# Patient Record
Sex: MALE | Race: WHITE | Employment: OTHER | URBAN - METROPOLITAN AREA
[De-identification: names, ages, dates, MRNs, and addresses within clinical notes are randomized per-mention and may not be internally consistent; named-entity substitution may affect disease eponyms.]

---

## 2018-03-07 ENCOUNTER — OFFICE VISIT (OUTPATIENT)
Dept: ORTHOPEDIC SURGERY | Age: 63
End: 2018-03-07

## 2018-03-07 VITALS
HEIGHT: 71 IN | BODY MASS INDEX: 29.41 KG/M2 | SYSTOLIC BLOOD PRESSURE: 149 MMHG | HEART RATE: 86 BPM | WEIGHT: 210.1 LBS | DIASTOLIC BLOOD PRESSURE: 97 MMHG

## 2018-03-07 DIAGNOSIS — M77.11 LATERAL EPICONDYLITIS OF RIGHT ELBOW: ICD-10-CM

## 2018-03-07 DIAGNOSIS — M25.521 RIGHT ELBOW PAIN: Primary | ICD-10-CM

## 2018-03-07 PROCEDURE — 99203 OFFICE O/P NEW LOW 30 MIN: CPT | Performed by: ORTHOPAEDIC SURGERY

## 2021-05-16 ENCOUNTER — HOSPITAL ENCOUNTER (EMERGENCY)
Age: 66
Discharge: HOME | End: 2021-05-16
Payer: COMMERCIAL

## 2021-05-16 ENCOUNTER — APPOINTMENT (OUTPATIENT)
Dept: CT IMAGING | Age: 66
End: 2021-05-16
Payer: COMMERCIAL

## 2021-05-16 VITALS
OXYGEN SATURATION: 96 % | DIASTOLIC BLOOD PRESSURE: 90 MMHG | HEIGHT: 71 IN | WEIGHT: 207 LBS | SYSTOLIC BLOOD PRESSURE: 138 MMHG | TEMPERATURE: 97.8 F | RESPIRATION RATE: 18 BRPM | HEART RATE: 66 BPM | BODY MASS INDEX: 28.98 KG/M2

## 2021-05-16 DIAGNOSIS — N13.2 HYDRONEPHROSIS WITH URETERAL CALCULUS: Primary | ICD-10-CM

## 2021-05-16 DIAGNOSIS — N20.0 KIDNEY STONE: ICD-10-CM

## 2021-05-16 LAB
A/G RATIO: 1.5 (ref 1.1–2.2)
ALBUMIN SERPL-MCNC: 4.3 G/DL (ref 3.4–5)
ALP BLD-CCNC: 79 U/L (ref 40–129)
ALT SERPL-CCNC: 23 U/L (ref 10–40)
ANION GAP SERPL CALCULATED.3IONS-SCNC: 7 MMOL/L (ref 3–16)
AST SERPL-CCNC: 23 U/L (ref 15–37)
BASOPHILS ABSOLUTE: 0 K/UL (ref 0–0.2)
BASOPHILS RELATIVE PERCENT: 0.8 %
BILIRUB SERPL-MCNC: 0.5 MG/DL (ref 0–1)
BILIRUBIN URINE: NEGATIVE
BLOOD, URINE: ABNORMAL
BUN BLDV-MCNC: 27 MG/DL (ref 7–20)
CALCIUM SERPL-MCNC: 9.9 MG/DL (ref 8.3–10.6)
CHLORIDE BLD-SCNC: 107 MMOL/L (ref 99–110)
CLARITY: CLEAR
CO2: 28 MMOL/L (ref 21–32)
COLOR: YELLOW
CREAT SERPL-MCNC: 1.1 MG/DL (ref 0.8–1.3)
EOSINOPHILS ABSOLUTE: 0.1 K/UL (ref 0–0.6)
EOSINOPHILS RELATIVE PERCENT: 1.6 %
EPITHELIAL CELLS, UA: ABNORMAL /HPF (ref 0–5)
GFR AFRICAN AMERICAN: >60
GFR NON-AFRICAN AMERICAN: >60
GLOBULIN: 2.9 G/DL
GLUCOSE BLD-MCNC: 129 MG/DL (ref 70–99)
GLUCOSE URINE: NEGATIVE MG/DL
HCT VFR BLD CALC: 45.3 % (ref 40.5–52.5)
HEMOGLOBIN: 15 G/DL (ref 13.5–17.5)
KETONES, URINE: NEGATIVE MG/DL
LEUKOCYTE ESTERASE, URINE: NEGATIVE
LYMPHOCYTES ABSOLUTE: 0.9 K/UL (ref 1–5.1)
LYMPHOCYTES RELATIVE PERCENT: 15 %
MCH RBC QN AUTO: 31.3 PG (ref 26–34)
MCHC RBC AUTO-ENTMCNC: 33.2 G/DL (ref 31–36)
MCV RBC AUTO: 94.3 FL (ref 80–100)
MICROSCOPIC EXAMINATION: YES
MONOCYTES ABSOLUTE: 0.5 K/UL (ref 0–1.3)
MONOCYTES RELATIVE PERCENT: 7.4 %
MUCUS: ABNORMAL /LPF
NEUTROPHILS ABSOLUTE: 4.5 K/UL (ref 1.7–7.7)
NEUTROPHILS RELATIVE PERCENT: 75.2 %
NITRITE, URINE: NEGATIVE
PDW BLD-RTO: 13.3 % (ref 12.4–15.4)
PH UA: 5.5 (ref 5–8)
PLATELET # BLD: 275 K/UL (ref 135–450)
PMV BLD AUTO: 8.4 FL (ref 5–10.5)
POTASSIUM SERPL-SCNC: 4.5 MMOL/L (ref 3.5–5.1)
PROTEIN UA: NEGATIVE MG/DL
RBC # BLD: 4.8 M/UL (ref 4.2–5.9)
RBC UA: ABNORMAL /HPF (ref 0–4)
SODIUM BLD-SCNC: 142 MMOL/L (ref 136–145)
SPECIFIC GRAVITY UA: >=1.03 (ref 1–1.03)
TOTAL PROTEIN: 7.2 G/DL (ref 6.4–8.2)
URINE TYPE: ABNORMAL
UROBILINOGEN, URINE: 0.2 E.U./DL
WBC # BLD: 6 K/UL (ref 4–11)
WBC UA: ABNORMAL /HPF (ref 0–5)

## 2021-05-16 PROCEDURE — 85025 COMPLETE CBC W/AUTO DIFF WBC: CPT

## 2021-05-16 PROCEDURE — 99283 EMERGENCY DEPT VISIT LOW MDM: CPT

## 2021-05-16 PROCEDURE — 6360000002 HC RX W HCPCS: Performed by: PHYSICIAN ASSISTANT

## 2021-05-16 PROCEDURE — 96374 THER/PROPH/DIAG INJ IV PUSH: CPT

## 2021-05-16 PROCEDURE — 81001 URINALYSIS AUTO W/SCOPE: CPT

## 2021-05-16 PROCEDURE — 96375 TX/PRO/DX INJ NEW DRUG ADDON: CPT

## 2021-05-16 PROCEDURE — 74176 CT ABD & PELVIS W/O CONTRAST: CPT

## 2021-05-16 PROCEDURE — 80053 COMPREHEN METABOLIC PANEL: CPT

## 2021-05-16 RX ORDER — ONDANSETRON 4 MG/1
4 TABLET, ORALLY DISINTEGRATING ORAL EVERY 8 HOURS PRN
Qty: 14 TABLET | Refills: 0 | Status: SHIPPED | OUTPATIENT
Start: 2021-05-16 | End: 2022-10-10

## 2021-05-16 RX ORDER — HYDROCODONE BITARTRATE AND ACETAMINOPHEN 5; 325 MG/1; MG/1
1 TABLET ORAL EVERY 8 HOURS PRN
Qty: 15 TABLET | Refills: 0 | Status: SHIPPED | OUTPATIENT
Start: 2021-05-16 | End: 2021-05-21

## 2021-05-16 RX ORDER — ONDANSETRON 2 MG/ML
4 INJECTION INTRAMUSCULAR; INTRAVENOUS ONCE
Status: COMPLETED | OUTPATIENT
Start: 2021-05-16 | End: 2021-05-16

## 2021-05-16 RX ORDER — KETOROLAC TROMETHAMINE 30 MG/ML
15 INJECTION, SOLUTION INTRAMUSCULAR; INTRAVENOUS ONCE
Status: COMPLETED | OUTPATIENT
Start: 2021-05-16 | End: 2021-05-16

## 2021-05-16 RX ORDER — ATORVASTATIN CALCIUM 40 MG/1
40 TABLET, FILM COATED ORAL DAILY
COMMUNITY

## 2021-05-16 RX ORDER — TAMSULOSIN HYDROCHLORIDE 0.4 MG/1
0.4 CAPSULE ORAL DAILY
Qty: 30 CAPSULE | Refills: 0 | Status: SHIPPED | OUTPATIENT
Start: 2021-05-16 | End: 2022-10-10

## 2021-05-16 RX ORDER — TADALAFIL 5 MG/1
TABLET ORAL
Status: ON HOLD | COMMUNITY
End: 2022-10-10

## 2021-05-16 RX ADMIN — ONDANSETRON 4 MG: 2 INJECTION INTRAMUSCULAR; INTRAVENOUS at 10:33

## 2021-05-16 RX ADMIN — KETOROLAC TROMETHAMINE 15 MG: 30 INJECTION, SOLUTION INTRAMUSCULAR; INTRAVENOUS at 10:32

## 2021-05-16 ASSESSMENT — PAIN SCALES - GENERAL
PAINLEVEL_OUTOF10: 8
PAINLEVEL_OUTOF10: 9

## 2021-05-16 ASSESSMENT — PAIN DESCRIPTION - PAIN TYPE: TYPE: ACUTE PAIN

## 2021-05-17 NOTE — ED PROVIDER NOTES
[facility] Emergency Department    CHIEF COMPLAINT  Flank Pain (left sided sudden onset 12)      SHARED SERVICE VISIT  Evaluated by CARSON. My supervising physician was available for consultation. HISTORY OF PRESENT ILLNESS  Kehinde Peace is a 60 y.o. male who presents to the ED complaining of left-sided flank pain that began this morning around 630. Patient states the pain began in the left flank and has now radiated and migrated to his left lower quadrant. Denies any urinary symptoms. Denies any fevers, chills, change in bowel movements. To a prior hernia surgery. Prior history of kidney stones in the past.  Patient states that he has developed nausea without vomiting. Denies chest pain or difficulty breathing. Jabs of pain is sharp. No other complaints, modifying factors or associated symptoms. Nursing notes reviewed. Past Medical History:   Diagnosis Date    Asthma     Hyperlipidemia      Past Surgical History:   Procedure Laterality Date    HERNIA REPAIR      SHOULDER ARTHROSCOPY       History reviewed. No pertinent family history.   Social History     Socioeconomic History    Marital status:      Spouse name: Not on file    Number of children: Not on file    Years of education: Not on file    Highest education level: Not on file   Occupational History    Not on file   Tobacco Use    Smoking status: Former Smoker     Quit date: 1983     Years since quittin.6    Smokeless tobacco: Never Used   Substance and Sexual Activity    Alcohol use: No    Drug use: No    Sexual activity: Not on file   Other Topics Concern    Not on file   Social History Narrative    Not on file     Social Determinants of Health     Financial Resource Strain:     Difficulty of Paying Living Expenses:    Food Insecurity:     Worried About Running Out of Food in the Last Year:     [address] in the Last Year:    Transportation Needs:     Lack of Transportation (Medical):  Lack of Transportation (Non-Medical):    Physical Activity:     Days of Exercise per Week:     Minutes of Exercise per Session:    Stress:     Feeling of Stress :    Social Connections:     Frequency of Communication with Friends and Family:     Frequency of Social Gatherings with Friends and Family:     Attends Worship Services:     Active Member of Clubs or Organizations:     Attends Club or Organization Meetings:     Marital Status:    Intimate Partner Violence:     Fear of Current or Ex-Partner:     Emotionally Abused:     Physically Abused:     Sexually Abused:      No current facility-administered medications for this encounter. Current Outpatient Medications   Medication Sig Dispense Refill    atorvastatin (LIPITOR) 40 MG tablet Take 40 mg by mouth daily      tadalafil (CIALIS) 5 MG tablet take 1 tablet by oral route  every day      ondansetron (ZOFRAN ODT) 4 MG disintegrating tablet Take 1 tablet by mouth every 8 hours as needed for Nausea or Vomiting 14 tablet 0    HYDROcodone-acetaminophen (NORCO) 5-325 MG per tablet Take 1 tablet by mouth every 8 hours as needed for Pain for up to 5 days. Intended supply: 3 days.  Take lowest dose possible to manage pain 15 tablet 0    tamsulosin (FLOMAX) 0.4 MG capsule Take 1 capsule by mouth daily 30 capsule 0    esomeprazole Magnesium (NEXIUM) 40 MG PACK Take 40 mg by mouth daily      fluticasone-salmeterol (ADVAIR) 100-50 MCG/DOSE diskus inhaler Inhale 1 puff into the lungs every 12 hours      diclofenac sodium (VOLTAREN) 1 % GEL Apply 2 g topically 4 times daily as needed for Pain 1 Tube 1     Allergies   Allergen Reactions    Bactrim [Sulfamethoxazole-Trimethoprim] Hives       REVIEW OF SYSTEMS  10 systems reviewed, pertinent positives per HPI otherwise noted to be negative    PHYSICAL EXAM  BP (!) 138/90   Pulse 66   Temp 97.8 °F (36.6 °C) (Oral)   Resp 18   Ht 5' 11\" (1.803 m)   Wt 207 lb (93.9 kg) SpO2 96%   BMI 28.87 kg/m²   GENERAL APPEARANCE: Awake and alert. Cooperative. HEAD: Normocephalic. Atraumatic. EYES: PERRL. EOM's grossly intact. ENT: Mucous membranes are moist.   NECK: Supple. HEART: RRR. No murmurs. LUNGS: Respirations unlabored. CTAB. Good air exchange. Speaking comfortably in full sentences. ABDOMEN: Soft. Nondistended. Tenderness to the right lower quadrant and right flank. No rebound or rigidity. No peritoneal signs. EXTREMITIES: No peripheral edema. Moves all extremities equally. All extremities neurovascularly intact. SKIN: Warm and dry. No acute rashes. NEUROLOGICAL: Alert and oriented. CN's 2-12 intact. No gross facial drooping. Strength 5/5, sensation intact. PSYCHIATRIC: Normal mood and affect. RADIOLOGY  CT ABDOMEN PELVIS WO CONTRAST Additional Contrast? None   Final Result   3.5 mm proximal left ureteral calculus causing severe left-sided   hydronephrosis. No other acute abnormality. Normal appendix. Moderately   enlarged prostate suspected be causing mild chronic bladder outlet   obstruction. Small hiatal hernia.                LABS  Labs Reviewed   CBC WITH AUTO DIFFERENTIAL - Abnormal; Notable for the following components:       Result Value    Lymphocytes Absolute 0.9 (*)     All other components within normal limits    Narrative:     Performed at:                  [address],  [place], [address]   Phone [phone]   COMPREHENSIVE METABOLIC PANEL - Abnormal; Notable for the following components:    Glucose 129 (*)     BUN 27 (*)     All other components within normal limits    Narrative:     Performed at:                  [facility]) [address],  [place], [address]   Phone [phone]   URINALYSIS - Abnormal; Notable for the following components:    Blood, Urine SMALL (*)     All other components within normal limits Narrative:     Performed at:                  [facility]) - [facility]                  [address] [facility], [facility][note 3][address]   Phone [phone]   MICROSCOPIC URINALYSIS - Abnormal; Notable for the following components:    Mucus, UA 3+ (*)     All other components within normal limits    Narrative:     Performed at:                  [facility]) - [facility]                  [address] [place] [address], [facility][note 3][address]   Phone [phone]       PROCEDURES  Unless otherwise noted below, none  Procedures    ED COURSE  Patient received Toradol for pain, with good relief. Triage vitals normal limits. A discussion was had with patient regarding CT findings regarding the seat rear hydronephrosis without BLADE with return precautions and plans to follow-up with urology. Risk management discussed and shared decision making had with patient and/or surrogate. All questions were answered. Patient will follow up with urology for further evaluation/treatment. All questions answered. Patient will return to ED for new/worsening symptoms. Patient was sent home with a prescription for Zofran, Norco and Flomax. CRITICAL CARE TIME  0 minutes of critical care time spent not including separately billable procedures. MDM  MDM  71-year-old male presents emergency department for evaluation of acute onset right flank pain and right lower quadrant pain. Nausea without vomiting. On arrival to ED patient's vitals within normal limits. On exam appears uncomfortable but not in acute distress. Physical exam has some tenderness to the right flank and right lower quadrant without any rebound rigidity. Lab work demonstrates no leukocytosis, BUN/creatinine 27-1 0.1 with GFR greater than 60. Urinalysis is remarkable for a small amount of blood, 3-4 RBCs without PVCs. Patient is afebrile. CT imaging demonstrated a 3.5 mm proximal stone with severe hydronephrosis.   I discussed this with the

## 2021-07-14 ENCOUNTER — HOSPITAL ENCOUNTER (EMERGENCY)
Age: 66
Discharge: HOME | End: 2021-07-14
Payer: COMMERCIAL

## 2021-07-14 ENCOUNTER — APPOINTMENT (OUTPATIENT)
Dept: GENERAL RADIOLOGY | Age: 66
End: 2021-07-14
Payer: COMMERCIAL

## 2021-07-14 VITALS
WEIGHT: 202 LBS | HEIGHT: 71 IN | TEMPERATURE: 97.9 F | SYSTOLIC BLOOD PRESSURE: 118 MMHG | HEART RATE: 74 BPM | DIASTOLIC BLOOD PRESSURE: 82 MMHG | BODY MASS INDEX: 28.28 KG/M2 | RESPIRATION RATE: 18 BRPM | OXYGEN SATURATION: 99 %

## 2021-07-14 DIAGNOSIS — R07.89 CHEST DISCOMFORT: Primary | ICD-10-CM

## 2021-07-14 LAB
A/G RATIO: 1.6 (ref 1.1–2.2)
ALBUMIN SERPL-MCNC: 4.1 G/DL (ref 3.4–5)
ALP BLD-CCNC: 80 U/L (ref 40–129)
ALT SERPL-CCNC: 17 U/L (ref 10–40)
ANION GAP SERPL CALCULATED.3IONS-SCNC: 10 MMOL/L (ref 3–16)
AST SERPL-CCNC: 15 U/L (ref 15–37)
BASOPHILS ABSOLUTE: 0.1 K/UL (ref 0–0.2)
BASOPHILS RELATIVE PERCENT: 1.3 %
BILIRUB SERPL-MCNC: 0.4 MG/DL (ref 0–1)
BUN BLDV-MCNC: 17 MG/DL (ref 7–20)
CALCIUM SERPL-MCNC: 9.4 MG/DL (ref 8.3–10.6)
CHLORIDE BLD-SCNC: 105 MMOL/L (ref 99–110)
CO2: 23 MMOL/L (ref 21–32)
CREAT SERPL-MCNC: 0.9 MG/DL (ref 0.8–1.3)
EKG ATRIAL RATE: 86 BPM
EKG DIAGNOSIS: NORMAL
EKG P AXIS: 36 DEGREES
EKG P-R INTERVAL: 138 MS
EKG Q-T INTERVAL: 336 MS
EKG QRS DURATION: 92 MS
EKG QTC CALCULATION (BAZETT): 402 MS
EKG R AXIS: 28 DEGREES
EKG T AXIS: 3 DEGREES
EKG VENTRICULAR RATE: 86 BPM
EOSINOPHILS ABSOLUTE: 0.1 K/UL (ref 0–0.6)
EOSINOPHILS RELATIVE PERCENT: 2.1 %
GFR AFRICAN AMERICAN: >60
GFR NON-AFRICAN AMERICAN: >60
GLOBULIN: 2.6 G/DL
GLUCOSE BLD-MCNC: 110 MG/DL (ref 70–99)
HCT VFR BLD CALC: 42.1 % (ref 40.5–52.5)
HEMOGLOBIN: 14.5 G/DL (ref 13.5–17.5)
LYMPHOCYTES ABSOLUTE: 1.2 K/UL (ref 1–5.1)
LYMPHOCYTES RELATIVE PERCENT: 24.5 %
MCH RBC QN AUTO: 31.4 PG (ref 26–34)
MCHC RBC AUTO-ENTMCNC: 34.5 G/DL (ref 31–36)
MCV RBC AUTO: 91.1 FL (ref 80–100)
MONOCYTES ABSOLUTE: 0.5 K/UL (ref 0–1.3)
MONOCYTES RELATIVE PERCENT: 9.5 %
NEUTROPHILS ABSOLUTE: 3.1 K/UL (ref 1.7–7.7)
NEUTROPHILS RELATIVE PERCENT: 62.6 %
PDW BLD-RTO: 13.5 % (ref 12.4–15.4)
PLATELET # BLD: 276 K/UL (ref 135–450)
PMV BLD AUTO: 7.3 FL (ref 5–10.5)
POTASSIUM REFLEX MAGNESIUM: 4.4 MMOL/L (ref 3.5–5.1)
RBC # BLD: 4.62 M/UL (ref 4.2–5.9)
SODIUM BLD-SCNC: 138 MMOL/L (ref 136–145)
TOTAL PROTEIN: 6.7 G/DL (ref 6.4–8.2)
TROPONIN: <0.01 NG/ML
TROPONIN: <0.01 NG/ML
WBC # BLD: 5 K/UL (ref 4–11)

## 2021-07-14 PROCEDURE — 85025 COMPLETE CBC W/AUTO DIFF WBC: CPT

## 2021-07-14 PROCEDURE — 80053 COMPREHEN METABOLIC PANEL: CPT

## 2021-07-14 PROCEDURE — 84484 ASSAY OF TROPONIN QUANT: CPT

## 2021-07-14 PROCEDURE — 71046 X-RAY EXAM CHEST 2 VIEWS: CPT

## 2021-07-14 PROCEDURE — 93010 ELECTROCARDIOGRAM REPORT: CPT | Performed by: INTERNAL MEDICINE

## 2021-07-14 PROCEDURE — 99284 EMERGENCY DEPT VISIT MOD MDM: CPT

## 2021-07-14 PROCEDURE — 93005 ELECTROCARDIOGRAM TRACING: CPT | Performed by: EMERGENCY MEDICINE

## 2021-07-14 NOTE — ED PROVIDER NOTES
EKG interpretation:  Normal sinus rhythm, rate of 86, normal axis, QTC within normal limits, no acute ST or T wave changes from prior dated 9/14/2016     Mackenzie Chong MD  07/14/21 5553

## 2021-07-14 NOTE — ED PROVIDER NOTES
tablet take 1 tablet by oral route  every day      ondansetron (ZOFRAN ODT) 4 MG disintegrating tablet Take 1 tablet by mouth every 8 hours as needed for Nausea or Vomiting 14 tablet 0    tamsulosin (FLOMAX) 0.4 MG capsule Take 1 capsule by mouth daily 30 capsule 0    diclofenac sodium (VOLTAREN) 1 % GEL Apply 2 g topically 4 times daily as needed for Pain 1 Tube 1    esomeprazole Magnesium (NEXIUM) 40 MG PACK Take 40 mg by mouth daily      fluticasone-salmeterol (ADVAIR) 100-50 MCG/DOSE diskus inhaler Inhale 1 puff into the lungs every 12 hours         ALLERGIES    Allergies   Allergen Reactions    Bactrim [Sulfamethoxazole-Trimethoprim] Hives       FAMILY HISTORY    History reviewed. No pertinent family history. SOCIAL HISTORY    Social History     Socioeconomic History    Marital status:      Spouse name: None    Number of children: None    Years of education: None    Highest education level: None   Occupational History    None   Tobacco Use    Smoking status: Former Smoker     Quit date: 1983     Years since quittin.8    Smokeless tobacco: Never Used   Substance and Sexual Activity    Alcohol use: No    Drug use: No    Sexual activity: None   Other Topics Concern    None   Social History Narrative    None     Social Determinants of Health     Financial Resource Strain:     Difficulty of Paying Living Expenses:    Food Insecurity:     Worried About Running Out of Food in the Last Year:     Ran Out of Food in the Last Year:    Transportation Needs:     Lack of Transportation (Medical):      Lack of Transportation (Non-Medical):    Physical Activity:     Days of Exercise per Week:     Minutes of Exercise per Session:    Stress:     Feeling of Stress :    Social Connections:     Frequency of Communication with Friends and Family:     Frequency of Social Gatherings with Friends and Family:     Attends [employer] Services:     Active Member of Clubs or Organizations:  Attends Club or Organization Meetings:     Marital Status:    Intimate Partner Violence:     Fear of Current or Ex-Partner:     Emotionally Abused:     Physically Abused:     Sexually Abused:        REVIEW OF SYSTEMS    10 systems reviewed, pertinent positives per HPI otherwise noted to be negative    PHYSICAL EXAM  Vitals:    07/14/21 1709   BP: 120/88   Pulse: 71   Resp: 16   Temp:    SpO2: 95%     GENERAL: Patient is well-developed, well-nourished. Awake and alert. Cooperative. Resting in bed. No apparent distress. HEENT:  Normocephalic, atraumatic. Conjunctiva appear normal. Sclera is non-icteric. External ears are normal.    NECK: Supple with normal ROM. Trachea midline. LUNGS: Equal and symmetric chest rise. Breathing is unlabored. Speaking comfortably in full sentences. Lungs are clear bilaterally to auscultation. Without wheezing, rales, or rhonchi. CADIOVASCULAR:  Regular rate and rhythm. Normal S1-S2 sounds. No murmurs, rubs, or gallops. Capillary refill is brisk in all 4 extremities. Bilateral lower extremities are equal in size, there is no swelling observed. There is no tenderness to palpation. There is no erythema observed or warmth palpated. GI: Soft, nontender, nondistended with positive bowel sounds. No rebound tenderness, guarding or any peritoneal signs. No masses or hepatosplenomegaly    MUSCULOSKELETAL:  No gross deformities or trauma noted. Moving all extremities equally and appropriately. Normal ROM. SKIN: Warm/dry. Skin is intact. No rashes/lesions noted. PSYCHIATRIC: Mood and affect appropriate. Speech is clear and articulate. NEUROLOGIC: Alert and oriented. No focal motor or sensory deficits. LABS  I have reviewed all labs for this visit.    Results for orders placed or performed during the hospital encounter of 07/14/21   CBC auto differential   Result Value Ref Range    WBC 5.0 4.0 - 11.0 K/uL    RBC 4.62 4.20 - 5.90 M/uL    Hemoglobin 14.5 13.5 - 17.5 g/dL Hematocrit 42.1 40.5 - 52.5 %    MCV 91.1 80.0 - 100.0 fL    MCH 31.4 26.0 - 34.0 pg    MCHC 34.5 31.0 - 36.0 g/dL    RDW 13.5 12.4 - 15.4 %    Platelets 911 658 - 428 K/uL    MPV 7.3 5.0 - 10.5 fL    Neutrophils % 62.6 %    Lymphocytes % 24.5 %    Monocytes % 9.5 %    Eosinophils % 2.1 %    Basophils % 1.3 %    Neutrophils Absolute 3.1 1.7 - 7.7 K/uL    Lymphocytes Absolute 1.2 1.0 - 5.1 K/uL    Monocytes Absolute 0.5 0.0 - 1.3 K/uL    Eosinophils Absolute 0.1 0.0 - 0.6 K/uL    Basophils Absolute 0.1 0.0 - 0.2 K/uL   Comprehensive Metabolic Panel w/ Reflex to MG   Result Value Ref Range    Sodium 138 136 - 145 mmol/L    Potassium reflex Magnesium 4.4 3.5 - 5.1 mmol/L    Chloride 105 99 - 110 mmol/L    CO2 23 21 - 32 mmol/L    Anion Gap 10 3 - 16    Glucose 110 (H) 70 - 99 mg/dL    BUN 17 7 - 20 mg/dL    CREATININE 0.9 0.8 - 1.3 mg/dL    GFR Non-African American >60 >60    GFR African American >60 >60    Calcium 9.4 8.3 - 10.6 mg/dL    Total Protein 6.7 6.4 - 8.2 g/dL    Albumin 4.1 3.4 - 5.0 g/dL    Albumin/Globulin Ratio 1.6 1.1 - 2.2    Total Bilirubin 0.4 0.0 - 1.0 mg/dL    Alkaline Phosphatase 80 40 - 129 U/L    ALT 17 10 - 40 U/L    AST 15 15 - 37 U/L    Globulin 2.6 g/dL   Troponin   Result Value Ref Range    Troponin <0.01 <0.01 ng/mL   Troponin   Result Value Ref Range    Troponin <0.01 <0.01 ng/mL   EKG 12 Lead   Result Value Ref Range    Ventricular Rate 86 BPM    Atrial Rate 86 BPM    P-R Interval 138 ms    QRS Duration 92 ms    Q-T Interval 336 ms    QTc Calculation (Bazett) 402 ms    P Axis 36 degrees    R Axis 28 degrees    T Axis 3 degrees    Diagnosis       Normal sinus rhythmPossible Inferior infarct (cited on or before 14-SEP-2016)Abnormal ECGWhen compared with ECG of 14-SEP-2016 14:04,Questionable change in initial forces of Inferior leads       RADIOLOGY    XR CHEST (2 VW)    Result Date: 7/14/2021  EXAMINATION: TWO XRAY VIEWS OF THE CHEST 7/14/2021 3:52 pm COMPARISON: Chest x-ray dated 2016 HISTORY: ORDERING SYSTEM PROVIDED HISTORY: chest pain TECHNOLOGIST PROVIDED HISTORY: Reason for exam:->chest pain Reason for Exam: chest pain, Acuity: Acute Type of Exam: Initial FINDINGS: HEART/MEDIASTINUM: The cardiomediastinal silhouette is within normal limits. PLEURA/LUNGS: There are no focal consolidations or pleural effusions. There is no appreciable pneumothorax. BONES/SOFT TISSUE: No acute abnormality. No radiographic evidence of acute pulmonary disease. HEART SCORE:    History: +1 for moderate suspicion  EKG: +0 for normal EKG   Age: +1 for age 44-72 years  Risk factors (includes HLD, HTN, DM, tobacco use, obesity, and +FHx): +1 for 1-2 risk factors  Initial troponin: +0 for negative troponin    Heart score: 3. This falls under the following category: Score of 0-3, which indicates a very low risk for major adverse cardiac event and supports early discharge    Score 0-3 0.9%-1.7% risk of major adverse cardiac event (MACE). In the HEART Score study, these patients were discharged. REVIEW OF RECORDS                       Test Date:    2016 11:27:10   Melba Jenkins TONY            Department:     Patient ID:   [id number]                 AVMO:           Esther Chow DEPI                     Technician:     [id number]               Requested By: Aundrea CHRISTOPHER Order Number: [id number]                [id number] MD:   France Polanco. Heather Dalton, [address] [place] [address]     AGE:     61     SKBPLE:  46  OU. WEIGHT:       213 lbs.                  SUPERVISING MD:    Shari Cao MD, [facility]     ORDERING MD:  SCOTT BEHRENS A.      SUPERVISING RN:    Andrea Suero     CLINICAL HISTORY:     CAD: No   Diabetic: No   HX of MI: No   Family HX: No   Prior CABG: No   Prior Cath: No   Angina: None                 REASON FOR EXAM:   abnormal ecg _____________________________________________________________________________     TEST OBSERVATIONS: SR at baseline. Exercise time 5:05, terminated due to SOB,   leg pain, fatigue. Pt denied chest pain/discomfort. Temi CARO         TEST TERMINATED DUE TO:   Dyspnea Maximum heart rate obtained   __________________________________________________________________________   PROCEDURE:     The patient was stressed using the RIMMA protocol under   the supervision of the cardiologist or nurse. Total exercise time is 05:05. Resting HR was 91 bmp. Maximum HR was 154 bpm;   which represented 96% of the maximum age predicted HR. Resting blood pressure was 130/90 mmHg and   maximum blood pressure was 170/100 mmHg.     The maximum work load was  6.4 mets. Resting ECG: Sinus     Stress ECG:     Negative for ischemia   Blood Pressure Response: Unremarkable     Heart Rate Response: Brisk     Exercise Tolerance: Fair   [facility] Treadmill Score: 5   __________________________________________________________________________     IMPRESSION:     The result of this study is negative for ischemia     The risk of this study is low       Electronically Signed On 9- 12:41:55 EDT by Rafia Millan. Kerri Cardoza MD     Date: 09/23/16   Received From: [facility]      ED COURSE/MDM  Patient seen and evaluated. Old records reviewed. Diagnostic testing reviewed and results discussed. I have evaluated this patient. My supervising physician was available for consultation. Hyacinth Berg presented to the ED today with above noted complaints. Physical exam unremarkable. No reproducible pain to palpation of the chest wall. Blood work is without evidence of systemic infection. No anemia. No electrolyte abnormality. No evidence of acute kidney injury or transaminitis. Troponin is negative. Chest x-ray is without acute findings. EKG shows sinus rhythm and is unchanged from prior. Repeat troponin negative.     Patient

## 2021-08-02 ENCOUNTER — OFFICE VISIT (OUTPATIENT)
Dept: CARDIOLOGY CLINIC | Age: 66
End: 2021-08-02
Payer: MEDICARE

## 2021-08-02 VITALS
WEIGHT: 211.5 LBS | HEART RATE: 70 BPM | SYSTOLIC BLOOD PRESSURE: 112 MMHG | BODY MASS INDEX: 29.61 KG/M2 | DIASTOLIC BLOOD PRESSURE: 75 MMHG | HEIGHT: 71 IN | OXYGEN SATURATION: 96 %

## 2021-08-02 DIAGNOSIS — Z87.891 HISTORY OF TOBACCO ABUSE: Primary | ICD-10-CM

## 2021-08-02 DIAGNOSIS — R07.9 CHEST PAIN, UNSPECIFIED TYPE: ICD-10-CM

## 2021-08-02 PROCEDURE — 1036F TOBACCO NON-USER: CPT | Performed by: INTERNAL MEDICINE

## 2021-08-02 PROCEDURE — G8419 CALC BMI OUT NRM PARAM NOF/U: HCPCS | Performed by: INTERNAL MEDICINE

## 2021-08-02 PROCEDURE — G8427 DOCREV CUR MEDS BY ELIG CLIN: HCPCS | Performed by: INTERNAL MEDICINE

## 2021-08-02 PROCEDURE — 99204 OFFICE O/P NEW MOD 45 MIN: CPT | Performed by: INTERNAL MEDICINE

## 2021-08-02 PROCEDURE — 3017F COLORECTAL CA SCREEN DOC REV: CPT | Performed by: INTERNAL MEDICINE

## 2021-08-02 RX ORDER — LISINOPRIL 10 MG/1
10 TABLET ORAL DAILY
Status: ON HOLD | COMMUNITY
Start: 2021-06-07 | End: 2022-10-10

## 2021-08-02 NOTE — PROGRESS NOTES
[facility]   Cardiac Consultation    Referring Provider:  Cielo Servin MD     Chief Complaint   Patient presents with    New Patient    Chest Pain     pressure on chest by sternum        History of Present Illness:  Eddie Jones 1955 who self-referred as new patient to establish care in case he needs cardiologist in future. He has PMH of borderline HTN, HLD, DM, and prior tobacco use. No documented CAD. He presented to ED 7/14/2021 with chest pain with nausea. Note EKG showed NSR, rate of 86, normal axis, QTC within normal limits (no acute changes from prior dated 9/14/2016). Troponin serial <0.1 x 2. Chest X-ray was negative. Note GXT myoview stress test 9/23/2016 was a low risk study negative for ischemia. Most recent GXT nuclear stress test 7/28/21 was normal with 56% EF. Today he reports left sided chest pressure prior to going to the ED. It was 6AM and he had gotten up to let dogs out. Reports left pressure pain that was constant for 3 days. It was associated with nausea. Nothing made better or worse. It resolved on its own. It has not happened again. He does get baseline ENGEL but he notes that it is due to his asthma. Note no further CP since ER visit. He has not taken lisinopril for the past 2 days. He notes that he has only had borderline hypertension. He is on BP medications at his request not at doctors recommendations. Patient with no complaints of chest pain, SOB, palpitations, dizziness, edema, or orthopnea/PND. Past Medical History:   has a past medical history of Asthma and Hyperlipidemia. Surgical History:   has a past surgical history that includes hernia repair and Shoulder arthroscopy. Social History:   reports that he quit smoking about 37 years ago. He has never used smokeless tobacco. He reports that he does not drink alcohol and does not use drugs. He quit smoking in 1983. He lives in [facility] [facility] with his wife. He has 9 children.       Family History:  Father  of CHF in [de-identified]  Mother passed 80. She had HTN. Home Medications:  Prior to Admission medications    Medication Sig Start Date End Date Taking? Authorizing Provider   lisinopril (PRINIVIL;ZESTRIL) 10 MG tablet Take 10 mg by mouth daily 21  Yes Historical Provider, MD   atorvastatin (LIPITOR) 40 MG tablet Take 40 mg by mouth daily   Yes Historical Provider, MD   tadalafil (CIALIS) 5 MG tablet take 1 tablet by oral route  every day   Yes Historical Provider, MD   esomeprazole Magnesium (NEXIUM) 40 MG PACK Take 40 mg by mouth daily   Yes Historical Provider, MD   fluticasone-salmeterol (ADVAIR) 100-50 MCG/DOSE diskus inhaler Inhale 1 puff into the lungs every 12 hours   Yes Historical Provider, MD   ondansetron (ZOFRAN ODT) 4 MG disintegrating tablet Take 1 tablet by mouth every 8 hours as needed for Nausea or Vomiting  Patient not taking: Reported on 2021   Reagan Reed PA-C   tamsulosin [facility]) 0.4 MG capsule Take 1 capsule by mouth daily  Patient not taking: Reported on 2021   Reagan Reed PA-C   diclofenac sodium (VOLTAREN) 1 % GEL Apply 2 g topically 4 times daily as needed for Pain 3/7/18 4/6/18  Omid Mahan MD        Allergies:  Bactrim [sulfamethoxazole-trimethoprim]     Review of Systems:   · Constitutional: there has been no unanticipated weight loss. There's been no change in energy level, sleep pattern, or activity level. · Eyes: No visual changes or diplopia. No scleral icterus. · ENT: No Headaches, hearing loss or vertigo. No mouth sores or sore throat. · Cardiovascular: Reviewed in HPI  · Respiratory: No cough or wheezing, no sputum production. No hematemesis. · Gastrointestinal: No abdominal pain, appetite loss, blood in stools. No change in bowel or bladder habits. · Genitourinary: No dysuria, trouble voiding, or hematuria. · Musculoskeletal:  No gait disturbance, weakness or joint complaints.   · Integumentary: No rash or pruritis. · Neurological: No headache, diplopia, change in muscle strength, numbness or tingling. No change in gait, balance, coordination, mood, affect, memory, mentation, behavior. · Psychiatric: No anxiety, no depression. · Endocrine: No malaise, fatigue or temperature intolerance. No excessive thirst, fluid intake, or urination. No tremor. · Hematologic/Lymphatic: No abnormal bruising or bleeding, blood clots or swollen lymph nodes. · Allergic/Immunologic: No nasal congestion or hives. Physical Examination:    Vitals:    08/02/21 0831   BP: 112/75   Pulse: 70   SpO2: 96%        Constitutional and General Appearance: NAD   Respiratory:  · Normal excursion and expansion without use of accessory muscles  · Resp Auscultation: Normal breath sounds without dullness. Clear. No crackles or wheezes. Cardiovascular:  · The apical impulses not displaced  · Heart tones are crisp and normal  · Cervical veins are not engorged  · The carotid upstroke is normal in amplitude and contour without delay or bruit  · Normal S1S2, No S3, No Murmur  · Peripheral pulses are symmetrical and full  · There is no clubbing, cyanosis of the extremities. · No edema  · Femoral Arteries: 2+ and equal  · Pedal Pulses: 2+ and equal   Abdomen:  · No masses or tenderness  · Liver/Spleen: No Abnormalities Noted  Neurological/Psychiatric:  · Alert and oriented in all spheres  · Moves all extremities well  · Exhibits normal gait balance and coordination  · No abnormalities of mood, affect, memory, mentation, or behavior are noted  Skin:  · Skin: warm and dry. LABS: 12/10/2020 AST 20, ALT 34, T Chol 203, , HDL 56, Trig 105  9/4/2020 , K+ 4.0, BUN 20, Cr 0.93     Assessment:     1. Chest pain, unspecified type: Unspecified CP in older male with CAD risk factors including age, prior tobacco abuse, borderline HTN, and HLD. Symptoms atypical being constant x 3 days. Ruled out for MI and no ischemic EKG changes.  Most recent GXT nuclear stress test 7/28/21 was normal with 56% EF. Would follow clinically at this time and focus on primary prevention measure. 2.      HLD: I personally reviewed most recent labs from 12/10/20 in Care Everywhere (see above). Well controlled overall and will  continue current medical regimen. Plan:  1. Recommend that you start taking a baby aspirin (81 mg) daily given CAD risk factors. 2. Maintain BP and cholesterol under control   3. Follow up as needed     This note was scribed in the presence of Antonio Jasso MD by Atilio Winn RN. I, Dr. Antonio Jasso, personally performed the services described in this documentation, as scribed by the above signed scribe in my presence. It is both accurate and complete to my knowledge. I agree with the details independently gathered by the clinical support staff, while the remaining scribed note accurately describes my personal service to the patient. Thank you for allowing me to participate in the care of this individual.    Sera Perales.  Khanh Whittington M.D., [facility] [place]

## 2021-08-02 NOTE — PATIENT INSTRUCTIONS
Plan:  1. Recommend that you start taking a baby aspirin (81 mg) daily. 2. Maintain BP and cholesterol under control   3.  Follow up as needed

## 2022-10-10 ENCOUNTER — APPOINTMENT (OUTPATIENT)
Dept: GENERAL RADIOLOGY | Age: 67
End: 2022-10-10
Payer: MEDICARE

## 2022-10-10 ENCOUNTER — APPOINTMENT (OUTPATIENT)
Dept: ULTRASOUND IMAGING | Age: 67
End: 2022-10-10
Payer: MEDICARE

## 2022-10-10 ENCOUNTER — APPOINTMENT (OUTPATIENT)
Dept: CT IMAGING | Age: 67
End: 2022-10-10
Payer: MEDICARE

## 2022-10-10 ENCOUNTER — HOSPITAL ENCOUNTER (OUTPATIENT)
Age: 67
Setting detail: OBSERVATION
Discharge: HOME | End: 2022-10-11
Attending: STUDENT IN AN ORGANIZED HEALTH CARE EDUCATION/TRAINING PROGRAM | Admitting: INTERNAL MEDICINE
Payer: MEDICARE

## 2022-10-10 DIAGNOSIS — K81.0 ACUTE CHOLECYSTITIS: Primary | ICD-10-CM

## 2022-10-10 DIAGNOSIS — R10.13 ABDOMINAL PAIN, EPIGASTRIC: ICD-10-CM

## 2022-10-10 LAB
A/G RATIO: 1.8 (ref 1.1–2.2)
ALBUMIN SERPL-MCNC: 4.3 G/DL (ref 3.4–5)
ALP BLD-CCNC: 73 U/L (ref 40–129)
ALT SERPL-CCNC: 13 U/L (ref 10–40)
ANION GAP SERPL CALCULATED.3IONS-SCNC: 11 MMOL/L (ref 3–16)
AST SERPL-CCNC: 16 U/L (ref 15–37)
BASOPHILS ABSOLUTE: 0 K/UL (ref 0–0.2)
BASOPHILS RELATIVE PERCENT: 1 %
BILIRUB SERPL-MCNC: 0.6 MG/DL (ref 0–1)
BUN BLDV-MCNC: 19 MG/DL (ref 7–20)
CALCIUM SERPL-MCNC: 9.5 MG/DL (ref 8.3–10.6)
CHLORIDE BLD-SCNC: 105 MMOL/L (ref 99–110)
CO2: 26 MMOL/L (ref 21–32)
CREAT SERPL-MCNC: 1 MG/DL (ref 0.8–1.3)
EKG ATRIAL RATE: 67 BPM
EKG DIAGNOSIS: NORMAL
EKG P AXIS: 51 DEGREES
EKG P-R INTERVAL: 146 MS
EKG Q-T INTERVAL: 414 MS
EKG QRS DURATION: 122 MS
EKG QTC CALCULATION (BAZETT): 437 MS
EKG R AXIS: 66 DEGREES
EKG T AXIS: 54 DEGREES
EKG VENTRICULAR RATE: 67 BPM
EOSINOPHILS ABSOLUTE: 0.1 K/UL (ref 0–0.6)
EOSINOPHILS RELATIVE PERCENT: 3 %
GFR AFRICAN AMERICAN: >60
GFR NON-AFRICAN AMERICAN: >60
GLUCOSE BLD-MCNC: 124 MG/DL (ref 70–99)
HCT VFR BLD CALC: 44.4 % (ref 40.5–52.5)
HEMOGLOBIN: 14.9 G/DL (ref 13.5–17.5)
LACTIC ACID: 0.8 MMOL/L (ref 0.4–2)
LIPASE: 16 U/L (ref 13–60)
LYMPHOCYTES ABSOLUTE: 1.1 K/UL (ref 1–5.1)
LYMPHOCYTES RELATIVE PERCENT: 22.5 %
MCH RBC QN AUTO: 31.2 PG (ref 26–34)
MCHC RBC AUTO-ENTMCNC: 33.5 G/DL (ref 31–36)
MCV RBC AUTO: 93.2 FL (ref 80–100)
MONOCYTES ABSOLUTE: 0.5 K/UL (ref 0–1.3)
MONOCYTES RELATIVE PERCENT: 10.6 %
NEUTROPHILS ABSOLUTE: 3.1 K/UL (ref 1.7–7.7)
NEUTROPHILS RELATIVE PERCENT: 62.9 %
PDW BLD-RTO: 13.6 % (ref 12.4–15.4)
PLATELET # BLD: 257 K/UL (ref 135–450)
PMV BLD AUTO: 8.2 FL (ref 5–10.5)
POTASSIUM SERPL-SCNC: 4 MMOL/L (ref 3.5–5.1)
PRO-BNP: 101 PG/ML (ref 0–124)
RBC # BLD: 4.77 M/UL (ref 4.2–5.9)
SODIUM BLD-SCNC: 142 MMOL/L (ref 136–145)
SPECIMEN STATUS: NORMAL
TOTAL PROTEIN: 6.7 G/DL (ref 6.4–8.2)
TROPONIN: <0.01 NG/ML
TROPONIN: <0.01 NG/ML
WBC # BLD: 4.9 K/UL (ref 4–11)

## 2022-10-10 PROCEDURE — 6360000004 HC RX CONTRAST MEDICATION: Performed by: STUDENT IN AN ORGANIZED HEALTH CARE EDUCATION/TRAINING PROGRAM

## 2022-10-10 PROCEDURE — 71045 X-RAY EXAM CHEST 1 VIEW: CPT

## 2022-10-10 PROCEDURE — C9113 INJ PANTOPRAZOLE SODIUM, VIA: HCPCS

## 2022-10-10 PROCEDURE — 99285 EMERGENCY DEPT VISIT HI MDM: CPT

## 2022-10-10 PROCEDURE — 76705 ECHO EXAM OF ABDOMEN: CPT

## 2022-10-10 PROCEDURE — 93005 ELECTROCARDIOGRAM TRACING: CPT | Performed by: STUDENT IN AN ORGANIZED HEALTH CARE EDUCATION/TRAINING PROGRAM

## 2022-10-10 PROCEDURE — 74177 CT ABD & PELVIS W/CONTRAST: CPT

## 2022-10-10 PROCEDURE — 83690 ASSAY OF LIPASE: CPT

## 2022-10-10 PROCEDURE — 96366 THER/PROPH/DIAG IV INF ADDON: CPT

## 2022-10-10 PROCEDURE — 6370000000 HC RX 637 (ALT 250 FOR IP)

## 2022-10-10 PROCEDURE — 83605 ASSAY OF LACTIC ACID: CPT

## 2022-10-10 PROCEDURE — 80053 COMPREHEN METABOLIC PANEL: CPT

## 2022-10-10 PROCEDURE — G0378 HOSPITAL OBSERVATION PER HR: HCPCS

## 2022-10-10 PROCEDURE — 96376 TX/PRO/DX INJ SAME DRUG ADON: CPT

## 2022-10-10 PROCEDURE — APPNB45 APP NON BILLABLE 31-45 MINUTES: Performed by: CLINICAL NURSE SPECIALIST

## 2022-10-10 PROCEDURE — 6360000002 HC RX W HCPCS

## 2022-10-10 PROCEDURE — 99219 PR INITIAL OBSERVATION CARE/DAY 50 MINUTES: CPT | Performed by: SURGERY

## 2022-10-10 PROCEDURE — 83880 ASSAY OF NATRIURETIC PEPTIDE: CPT

## 2022-10-10 PROCEDURE — 84484 ASSAY OF TROPONIN QUANT: CPT

## 2022-10-10 PROCEDURE — 2580000003 HC RX 258: Performed by: STUDENT IN AN ORGANIZED HEALTH CARE EDUCATION/TRAINING PROGRAM

## 2022-10-10 PROCEDURE — 96365 THER/PROPH/DIAG IV INF INIT: CPT

## 2022-10-10 PROCEDURE — 6360000002 HC RX W HCPCS: Performed by: STUDENT IN AN ORGANIZED HEALTH CARE EDUCATION/TRAINING PROGRAM

## 2022-10-10 PROCEDURE — 96375 TX/PRO/DX INJ NEW DRUG ADDON: CPT

## 2022-10-10 PROCEDURE — 2580000003 HC RX 258

## 2022-10-10 PROCEDURE — 94640 AIRWAY INHALATION TREATMENT: CPT

## 2022-10-10 PROCEDURE — 85025 COMPLETE CBC W/AUTO DIFF WBC: CPT

## 2022-10-10 RX ORDER — ATORVASTATIN CALCIUM 40 MG/1
40 TABLET, FILM COATED ORAL DAILY
Status: DISCONTINUED | OUTPATIENT
Start: 2022-10-10 | End: 2022-10-11 | Stop reason: HOSPADM

## 2022-10-10 RX ORDER — HYDROCODONE BITARTRATE AND ACETAMINOPHEN 5; 325 MG/1; MG/1
1 TABLET ORAL EVERY 4 HOURS PRN
Status: DISCONTINUED | OUTPATIENT
Start: 2022-10-10 | End: 2022-10-11 | Stop reason: HOSPADM

## 2022-10-10 RX ORDER — ACETAMINOPHEN 325 MG/1
650 TABLET ORAL EVERY 6 HOURS PRN
Status: DISCONTINUED | OUTPATIENT
Start: 2022-10-10 | End: 2022-10-11 | Stop reason: HOSPADM

## 2022-10-10 RX ORDER — SODIUM CHLORIDE 0.9 % (FLUSH) 0.9 %
5-40 SYRINGE (ML) INJECTION EVERY 12 HOURS SCHEDULED
Status: DISCONTINUED | OUTPATIENT
Start: 2022-10-10 | End: 2022-10-11 | Stop reason: HOSPADM

## 2022-10-10 RX ORDER — SODIUM CHLORIDE 9 MG/ML
INJECTION, SOLUTION INTRAVENOUS PRN
Status: DISCONTINUED | OUTPATIENT
Start: 2022-10-10 | End: 2022-10-11 | Stop reason: HOSPADM

## 2022-10-10 RX ORDER — PANTOPRAZOLE SODIUM 40 MG/1
40 TABLET, DELAYED RELEASE ORAL DAILY
Status: DISCONTINUED | OUTPATIENT
Start: 2022-10-10 | End: 2022-10-11 | Stop reason: HOSPADM

## 2022-10-10 RX ORDER — MORPHINE SULFATE 4 MG/ML
4 INJECTION, SOLUTION INTRAMUSCULAR; INTRAVENOUS ONCE
Status: COMPLETED | OUTPATIENT
Start: 2022-10-10 | End: 2022-10-10

## 2022-10-10 RX ORDER — MORPHINE SULFATE 2 MG/ML
2 INJECTION, SOLUTION INTRAMUSCULAR; INTRAVENOUS EVERY 4 HOURS PRN
Status: DISCONTINUED | OUTPATIENT
Start: 2022-10-10 | End: 2022-10-11 | Stop reason: HOSPADM

## 2022-10-10 RX ORDER — ENOXAPARIN SODIUM 100 MG/ML
40 INJECTION SUBCUTANEOUS DAILY
Status: DISCONTINUED | OUTPATIENT
Start: 2022-10-11 | End: 2022-10-11 | Stop reason: HOSPADM

## 2022-10-10 RX ORDER — ACETAMINOPHEN 650 MG/1
650 SUPPOSITORY RECTAL EVERY 6 HOURS PRN
Status: DISCONTINUED | OUTPATIENT
Start: 2022-10-10 | End: 2022-10-11 | Stop reason: HOSPADM

## 2022-10-10 RX ORDER — POLYETHYLENE GLYCOL 3350 17 G/17G
17 POWDER, FOR SOLUTION ORAL DAILY PRN
Status: DISCONTINUED | OUTPATIENT
Start: 2022-10-10 | End: 2022-10-11 | Stop reason: HOSPADM

## 2022-10-10 RX ORDER — SODIUM CHLORIDE 0.9 % (FLUSH) 0.9 %
5-40 SYRINGE (ML) INJECTION PRN
Status: DISCONTINUED | OUTPATIENT
Start: 2022-10-10 | End: 2022-10-11 | Stop reason: HOSPADM

## 2022-10-10 RX ORDER — ONDANSETRON 4 MG/1
4 TABLET, ORALLY DISINTEGRATING ORAL EVERY 8 HOURS PRN
Status: DISCONTINUED | OUTPATIENT
Start: 2022-10-10 | End: 2022-10-11 | Stop reason: HOSPADM

## 2022-10-10 RX ORDER — ONDANSETRON 2 MG/ML
4 INJECTION INTRAMUSCULAR; INTRAVENOUS EVERY 6 HOURS PRN
Status: DISCONTINUED | OUTPATIENT
Start: 2022-10-10 | End: 2022-10-11 | Stop reason: HOSPADM

## 2022-10-10 RX ORDER — ONDANSETRON 2 MG/ML
INJECTION INTRAMUSCULAR; INTRAVENOUS
Status: DISPENSED
Start: 2022-10-10 | End: 2022-10-10

## 2022-10-10 RX ORDER — ONDANSETRON 2 MG/ML
4 INJECTION INTRAMUSCULAR; INTRAVENOUS ONCE
Status: COMPLETED | OUTPATIENT
Start: 2022-10-10 | End: 2022-10-10

## 2022-10-10 RX ORDER — PANTOPRAZOLE SODIUM 40 MG/10ML
INJECTION, POWDER, LYOPHILIZED, FOR SOLUTION INTRAVENOUS
Status: COMPLETED
Start: 2022-10-10 | End: 2022-10-10

## 2022-10-10 RX ORDER — LISINOPRIL 10 MG/1
10 TABLET ORAL DAILY
Status: DISCONTINUED | OUTPATIENT
Start: 2022-10-10 | End: 2022-10-11 | Stop reason: HOSPADM

## 2022-10-10 RX ORDER — PANTOPRAZOLE SODIUM 40 MG/10ML
40 INJECTION, POWDER, LYOPHILIZED, FOR SOLUTION INTRAVENOUS ONCE
Status: COMPLETED | OUTPATIENT
Start: 2022-10-10 | End: 2022-10-10

## 2022-10-10 RX ORDER — HYDROCODONE BITARTRATE AND ACETAMINOPHEN 5; 325 MG/1; MG/1
2 TABLET ORAL EVERY 4 HOURS PRN
Status: DISCONTINUED | OUTPATIENT
Start: 2022-10-10 | End: 2022-10-11 | Stop reason: HOSPADM

## 2022-10-10 RX ORDER — LISINOPRIL 10 MG/1
10 TABLET ORAL DAILY
Status: CANCELLED | OUTPATIENT
Start: 2022-10-10

## 2022-10-10 RX ORDER — LABETALOL HYDROCHLORIDE 5 MG/ML
10 INJECTION, SOLUTION INTRAVENOUS EVERY 6 HOURS PRN
Status: DISCONTINUED | OUTPATIENT
Start: 2022-10-10 | End: 2022-10-11 | Stop reason: HOSPADM

## 2022-10-10 RX ADMIN — IOPAMIDOL 75 ML: 755 INJECTION, SOLUTION INTRAVENOUS at 07:25

## 2022-10-10 RX ADMIN — MORPHINE SULFATE 4 MG: 4 INJECTION, SOLUTION INTRAMUSCULAR; INTRAVENOUS at 06:12

## 2022-10-10 RX ADMIN — PANTOPRAZOLE SODIUM 40 MG: 40 INJECTION, POWDER, FOR SOLUTION INTRAVENOUS at 09:03

## 2022-10-10 RX ADMIN — PANTOPRAZOLE SODIUM 40 MG: 40 INJECTION, POWDER, LYOPHILIZED, FOR SOLUTION INTRAVENOUS at 09:03

## 2022-10-10 RX ADMIN — PIPERACILLIN AND TAZOBACTAM 3375 MG: 3; .375 INJECTION, POWDER, FOR SOLUTION INTRAVENOUS at 09:09

## 2022-10-10 RX ADMIN — MORPHINE SULFATE 4 MG: 4 INJECTION, SOLUTION INTRAMUSCULAR; INTRAVENOUS at 07:09

## 2022-10-10 RX ADMIN — PIPERACILLIN AND TAZOBACTAM 3375 MG: 3; .375 INJECTION, POWDER, FOR SOLUTION INTRAVENOUS at 18:02

## 2022-10-10 RX ADMIN — ONDANSETRON 4 MG: 2 INJECTION INTRAMUSCULAR; INTRAVENOUS at 09:02

## 2022-10-10 RX ADMIN — LISINOPRIL 10 MG: 10 TABLET ORAL at 17:59

## 2022-10-10 RX ADMIN — PANTOPRAZOLE SODIUM 40 MG: 40 TABLET, DELAYED RELEASE ORAL at 17:59

## 2022-10-10 RX ADMIN — ONDANSETRON 4 MG: 2 INJECTION INTRAMUSCULAR; INTRAVENOUS at 06:11

## 2022-10-10 RX ADMIN — Medication 2 PUFF: at 15:53

## 2022-10-10 RX ADMIN — ATORVASTATIN CALCIUM 40 MG: 40 TABLET, FILM COATED ORAL at 17:59

## 2022-10-10 RX ADMIN — Medication 2 PUFF: at 19:17

## 2022-10-10 ASSESSMENT — PAIN SCALES - GENERAL
PAINLEVEL_OUTOF10: 3
PAINLEVEL_OUTOF10: 0
PAINLEVEL_OUTOF10: 9
PAINLEVEL_OUTOF10: 0
PAINLEVEL_OUTOF10: 1
PAINLEVEL_OUTOF10: 4
PAINLEVEL_OUTOF10: 1

## 2022-10-10 ASSESSMENT — PAIN - FUNCTIONAL ASSESSMENT
PAIN_FUNCTIONAL_ASSESSMENT: 0-10
PAIN_FUNCTIONAL_ASSESSMENT: 0-10

## 2022-10-10 ASSESSMENT — PAIN DESCRIPTION - LOCATION
LOCATION: ABDOMEN
LOCATION: OTHER (COMMENT)

## 2022-10-10 NOTE — H&P
History & Physical      PCP: Azucena Campuzano MD    Date of Admission: 10/10/2022    Date of Service: Pt seen/examined on 10/10/22 and Placed in Observation. Chief Complaint:  chest pain      History Of Present Illness:      Mr. Jerson Mendieta is a 78 y.o. male who presented to [facility] with epigastric pain. He has a past medical history of hypertension, hyperlipidemia, DM, asthma, GERD. He reports epigastric abdominal and chest pain that began this morning around 3 AM today with radiation to his back and left shoulder. He describes a constant cramping pain rated 9/10 at its worst. Endorses nausea without vomiting. Endorses dizziness secondary to chronic vertigo. Denies constipation, diarrhea, fever, chills, cough, SOA, sore throat, hemoptysis, vision changes, SOA, palpitations, melena, hematochezia. He denies alcohol use or current tobacco use, quit smoking 40 years ago. No other illicit substance use. Has had prior episode where he came to [facility] ED with similar presentation on 7/14/21. Past Medical History:          Diagnosis Date    Asthma     Hyperlipidemia        Past Surgical History:          Procedure Laterality Date    HERNIA REPAIR      SHOULDER ARTHROSCOPY         Medications Prior to Admission:      Prior to Admission medications    Medication Sig Start Date End Date Taking?  Authorizing Provider   lisinopril (PRINIVIL;ZESTRIL) 10 MG tablet Take 10 mg by mouth daily 6/7/21   Historical Provider, MD   atorvastatin (LIPITOR) 40 MG tablet Take 40 mg by mouth daily    Historical Provider, MD   tadalafil (CIALIS) 5 MG tablet take 1 tablet by oral route  every day    Historical Provider, MD   diclofenac sodium (VOLTAREN) 1 % GEL Apply 2 g topically 4 times daily as needed for Pain 3/7/18 4/6/18  Mis Kevin MD   esomeprazole Magnesium (NEXIUM) 40 MG PACK Take 40 mg by mouth daily    Historical Provider, MD   fluticasone-salmeterol (ADVAIR) 100-50 MCG/DOSE diskus inhaler Inhale 1 puff into the lungs every 12 hours    Historical Provider, MD       Allergies:  Bactrim [sulfamethoxazole-trimethoprim]    Social History:      The patient currently lives with his wife    TOBACCO:   reports that he quit smoking about 39 years ago. He has never used smokeless tobacco.  ETOH:   reports no history of alcohol use. E-cigarette/Vaping       Questions Responses    E-cigarette/Vaping Use     Start Date     Passive Exposure     Quit Date     Counseling Given     Comments               Family History:      Reviewed in detail and negative for DM, CAD, Cancer, CVA. Positive as follows:    No family history on file. REVIEW OF SYSTEMS COMPLETED:   Pertinent positives as noted in the HPI. All other systems reviewed and negative. PHYSICAL EXAM PERFORMED:    BP (!) 140/87   Pulse 58   Temp 98.6 °F (37 °C) (Oral)   Resp 16   Ht 5' 11\" (1.803 m)   Wt 185 lb (83.9 kg)   SpO2 97%   BMI 25.80 kg/m²     General appearance:  No apparent distress, appears stated age and cooperative. HEENT:  Normal cephalic, atraumatic without obvious deformity. Pupils equal, round. Extra ocular muscles intact. Conjunctivae/corneas clear. Neck: Supple, with full range of motion. No jugular venous distention  Respiratory:  Normal respiratory effort. Clear to auscultation, bilaterally without Rales/Wheezes/Rhonchi. Cardiovascular:  Regular rate and rhythm with normal S1/S2 without murmurs, rubs or gallops. Abdomen: Soft, non-tender, non-distended with normal bowel sounds. Musculoskeletal:  No clubbing, cyanosis or edema bilaterally. Skin: Skin color, texture, turgor normal.  No rashes or lesions. Neurologic:  Neurovascularly intact without any focal sensory/motor deficits.   Psychiatric:  Alert and oriented, thought content appropriate, normal insight  Capillary Refill: Brisk,3 seconds, normal  Peripheral Pulses: +2 palpable, equal bilaterally       Labs:     Recent Labs     10/10/22  0554   WBC 4.9   HGB 14.9   HCT 44.4      Recent Labs     10/10/22  0554      K 4.0      CO2 26   BUN 19   CREATININE 1.0   CALCIUM 9.5     Recent Labs     10/10/22  0554   AST 16   ALT 13   BILITOT 0.6   ALKPHOS 73     No results for input(s): INR in the last 72 hours. Recent Labs     10/10/22  0554   TROPONINI <0.01       Urinalysis:      Lab Results   Component Value Date/Time    NITRU Negative 05/16/2021 09:57 AM    WBCUA 0-2 05/16/2021 09:57 AM    RBCUA 3-4 05/16/2021 09:57 AM    BLOODU SMALL 05/16/2021 09:57 AM    SPECGRAV >=1.030 05/16/2021 09:57 AM    GLUCOSEU Negative 05/16/2021 09:57 AM       Radiology:     CXR: I have reviewed the CXR with the following interpretation: no acute process  EKG:  I have reviewed the EKG with the following interpretation: normal sinus rhythm, no STEMI     US ABDOMEN LIMITED Specify organ? LIVER, GALLBLADDER   Final Result   Nonspecific gallbladder wall thickening. This could be secondary to acute   cholecystitis in the appropriate clinical scenario. De pendant echoes are   seen in the gallbladder either sludge or tiny stones      Mild pelvicaliectasis on right. CT ABDOMEN PELVIS W IV CONTRAST Additional Contrast? None   Final Result   Acute cholecystitis. XR CHEST PORTABLE   Final Result   No acute process. ASSESSMENT:    Active Hospital Problems    Diagnosis Date Noted    Acute cholecystitis [K81.0] 10/10/2022     Priority: Medium           PLAN:    Acute Cholecystitis  - place in observation with telemetry  - EKG showing normal sinus rhythm, no STEMI  - troponin negative x2  - CT abdomen showing acute cholecystitis, Abdominal US revealing GB wall thickening with sludge or small stones  - general surgery consulted  - NPO for now  - start IV zosyn    2. Essential hypertension  - not well controlled, 178/101 in ED, now 140/87  - continue lisinopril 10 mg daily  - start IV labetalol 10 mg G4V PRN if systolic >782 or diastolic > 474    3.  Hyperlipidemia  - continue atorvastatin 40 mg PO daily    4. GERD  - continue nexium 40 mg PO daily    5. Asthma  - continue Dulera inhaler 2 puffs twice daily    DVT Prophylaxis: lovenox starting 10/11 pending surgery consult  Diet: No diet orders on file  Code Status: No Order    PT/OT Eval Status: not indicated    Dispo - 1-2 days pending surgery consult       Rebecca Garcia DO, PGY-1   [address] Residency Program        Thank you Darlene Mancuso MD for the opportunity to be involved in this patient's care. If you have any questions or concerns please feel free to contact me at [phone].

## 2022-10-10 NOTE — ED TRIAGE NOTES
Pt to ED from home for complaints of epigastric/chest pain that radiates to his back. Pt states approx 3 hours ago this woke him from his sleep. Pt describes it as crampy, denies any PTA meds/hx of similar incident.  Pt is A&Ox4

## 2022-10-10 NOTE — CONSULTS
Department of General Surgery Consult    PATIENT NAME: Vance Velasquez   YOB: 1955    ADMISSION DATE: 10/10/2022  5:43 AM      TODAY'S DATE: 10/10/2022    Reason for Consult:  cholecystitis    Chief Complaint: abdominal pain    Requesting Physician:  Bryson Cordero    HISTORY OF PRESENT ILLNESS:              The patient is a 78 y.o. male who presents with complaints of abdominal pain that started in the middle of the night; he reports associated nausea, but has not had any vomiting. He reports the pain was sharp located more in epigastric area and radiated to his back. He denies fever or chills. He reports he has never had pain like this before, but does take Nexium for reflux. Currently he feels well without pain or nausea. He is hungry and wants at least something to drink.      Past Medical History:        Diagnosis Date    Asthma     Hyperlipidemia        Past Surgical History:        Procedure Laterality Date    CYSTOSCOPY      2021    HERNIA REPAIR      SHOULDER ARTHROSCOPY         Current Medications:   Current Facility-Administered Medications: atorvastatin (LIPITOR) tablet 40 mg, 40 mg, Oral, Daily  pantoprazole (PROTONIX) tablet 40 mg, 40 mg, Oral, Daily  mometasone-formoterol (DULERA) 100-5 MCG/ACT inhaler 2 puff, 2 puff, Inhalation, BID  sodium chloride flush 0.9 % injection 5-40 mL, 5-40 mL, IntraVENous, 2 times per day  sodium chloride flush 0.9 % injection 5-40 mL, 5-40 mL, IntraVENous, PRN  0.9 % sodium chloride infusion, , IntraVENous, PRN  [START ON 10/11/2022] enoxaparin (LOVENOX) injection 40 mg, 40 mg, SubCUTAneous, Daily  ondansetron (ZOFRAN-ODT) disintegrating tablet 4 mg, 4 mg, Oral, Q8H PRN **OR** ondansetron (ZOFRAN) injection 4 mg, 4 mg, IntraVENous, Q6H PRN  polyethylene glycol (GLYCOLAX) packet 17 g, 17 g, Oral, Daily PRN  acetaminophen (TYLENOL) tablet 650 mg, 650 mg, Oral, Q6H PRN **OR** acetaminophen (TYLENOL) suppository 650 mg, 650 mg, Rectal, Q6H PRN  HYDROcodone-acetaminophen (NORCO) 5-325 MG per tablet 1 tablet, 1 tablet, Oral, Q4H PRN **OR** HYDROcodone-acetaminophen (NORCO) 5-325 MG per tablet 2 tablet, 2 tablet, Oral, Q4H PRN  morphine (PF) injection 2 mg, 2 mg, IntraVENous, Q4H PRN  piperacillin-tazobactam (ZOSYN) 3,375 mg in dextrose 5 % 50 mL IVPB extended infusion (mini-bag), 3,375 mg, IntraVENous, Q8H  labetalol (NORMODYNE;TRANDATE) injection 10 mg, 10 mg, IntraVENous, Q6H PRN  lisinopril (PRINIVIL;ZESTRIL) tablet 10 mg, 10 mg, Oral, Daily  Prior to Admission medications    Medication Sig Start Date End Date Taking? Authorizing Provider   albuterol sulfate (PROAIR RESPICLICK) 729 (90 Base) MCG/ACT aerosol powder inhalation Inhale into the lungs every 6 hours as needed for Wheezing or Shortness of Breath   Yes Historical Provider, MD   atorvastatin (LIPITOR) 40 MG tablet Take 40 mg by mouth daily    Historical Provider, MD   esomeprazole Magnesium (NEXIUM) 40 MG PACK Take 40 mg by mouth daily    Historical Provider, MD   fluticasone-salmeterol (ADVAIR) 100-50 MCG/DOSE diskus inhaler Inhale 1 puff into the lungs every 12 hours    Historical Provider, MD        Allergies:  Bactrim [sulfamethoxazole-trimethoprim]    Social History:   TOBACCO:   reports that he quit smoking about 39 years ago. His smoking use included cigarettes. He has never used smokeless tobacco.  ETOH:   reports no history of alcohol use. DRUGS:   reports no history of drug use. Family History:        Problem Relation Age of Onset    Other Father     Cancer Brother     Alcohol Abuse Brother        REVIEW OF SYSTEMS:  CONSTITUTIONAL:  negative  HEENT:  negative  RESPIRATORY:  negative  CARDIOVASCULAR:  negative  GASTROINTESTINAL:  negative except for abdominal pain  GENITOURINARY:  negative  HEMATOLOGIC/LYMPHATIC:  negative  NEUROLOGICAL:  Negative  * All other ROS reviewed and negative.        PHYSICAL EXAM:  VITALS:  BP (!) 166/91   Pulse 59   Temp 97.8 °F (36.6 °C) (Oral) Resp 16   Ht 5' 11\" (1.803 m)   Wt 185 lb (83.9 kg)   SpO2 96%   BMI 25.80 kg/m²   24HR INTAKE/OUTPUT:    No intake/output data recorded. No intake/output data recorded. CONSTITUTIONAL:  alert, no apparent distress and normal weight  EYES:  PERRL, sclera clear  ENT:  Normocephalic,atraumatic, without obvious abnormality  NECK:  supple, symmetrical, trachea midline  LUNGS: Resp effort easy and unlabored, no crackles or wheezing  CARDIOVASCULAR:  NO JVD, regular rate and rhythm   ABDOMEN:   normal bowel sounds, soft, non-distended, non-tender,   MUSCULOSKELETAL: No clubbing or cyanosis, 0+ pitting edema lower extremities  NEUROLOGIC:  Mental Status Exam:  Level of Alertness:   awake  PSYCHIATRIC:   person, place, time  SKIN:  no jaundice    DATA:    CBC:   Recent Labs     10/10/22  0554   WBC 4.9   HGB 14.9   HCT 44.4        BMP:    Recent Labs     10/10/22  0554      K 4.0      CO2 26   BUN 19   CREATININE 1.0   GLUCOSE 124*     Hepatic:   Recent Labs     10/10/22  0554   AST 16   ALT 13   BILITOT 0.6   ALKPHOS 73     Mag:    No results for input(s): MG in the last 72 hours. Phos:   No results for input(s): PHOS in the last 72 hours. INR: No results for input(s): INR in the last 72 hours. Radiology Review: Images personally reviewed by me. EXAMINATION:   RIGHT UPPER QUADRANT ULTRASOUND     10/10/2022 8:01 am     COMPARISON:   Recent CT scan     HISTORY:   ORDERING SYSTEM PROVIDED HISTORY: RUQ abdominal pain   TECHNOLOGIST PROVIDED HISTORY:     Reason for exam:->RUQ abdominal pain   Specify organ?->LIVER   Specify organ?->GALLBLADDER     FINDINGS:   Patient body habitus limits the study, as there is increased attenuation of   the ultrasound beam. This decreases sensitivity and specificity. LIVER:  The liver demonstrates normal echogenicity without evidence of   intrahepatic biliary ductal dilatation. BILIARY SYSTEM:  Gallbladder wall is thickened at 5 mm.   De pendant echoes   are seen in the gallbladder either sludge or stones. .  No sonographic Cobb   sign. Overall gallbladder is poorly evaluated secondary to body habitus. Common bile duct is within normal limits measuring 4 mm. RIGHT KIDNEY: There is mild pelvicaliectasis on right. PANCREAS:  Visualized portions of the pancreas are unremarkable. OTHER: No evidence of right upper quadrant ascites. Impression:     Nonspecific gallbladder wall thickening. This could be secondary to acute   cholecystitis in the appropriate clinical scenario. De pendant echoes are   seen in the gallbladder either sludge or tiny stones     Mild pelvicaliectasis on right. IMPRESSION/RECOMMENDATIONS:    Acute calculous cholecystitis:     Agree with IV antibiotics. Clear liquid diet today. IF he continues to feel well with conservative therapy, then would anticipate advancing diet to low fat and d/c on PO antibiotics to have pt f/u for elective cholecystectomy in 2-3 weeks. Electronically signed by Shira Naqvi, APRN - [address] [facility]  [id number]    Patient seen and agree with above and more than half of the total time was spent by me on the encounter. Upper abdominal pain overnight with nausea and emesis. No fevers. None like this before. Currently resolved. Start diet and if tolerates discharge home, likely tomorrow, with plans for OR in a few weeks.     Ashley Santos MD

## 2022-10-10 NOTE — CONSULTS
Consult placed    Who:Dr. Jack Davis  Date:10/10/2022,  Time:1:21 PM        Electronically signed by Walker Varma on 10/10/2022 at 1:21 PM

## 2022-10-10 NOTE — ED NOTES
Pain in luq abdomen radiating to chest and back. C/o nausea. Active bs x 4 quads. Denies vomiting or diarrhea. Hr regular.  Lungs clear     Brooke Simmons RN  10/10/22 0934

## 2022-10-10 NOTE — PROGRESS NOTES
4 Eyes Skin Assessment     The patient is being assess for  Admission    I agree that 2 RN's have performed a thorough Head to Toe Skin Assessment on the patient. ALL assessment sites listed below have been assessed. Areas assessed by both nurses: LVLT  [x]   Head, Face, and Ears   [x]   Shoulders, Back, and Chest  [x]   Arms, Elbows, and Hands   [x]   Coccyx, Sacrum, and IschIum  [x]   Legs, Feet, and Heels        Does the Patient have Skin Breakdown?   No         Jorge Prevention initiated:  No   Wound Care Orders initiated:  NA      [facility] nurse consulted for Pressure Injury (Stage 3,4, Unstageable, DTI, NWPT, and Complex wounds), New and Established Ostomies:  NA      Nurse 1 eSignature: Electronically signed by Maria Victoria Vasquez RN on 10/10/22 at [address] PM EDT    **SHARE this note so that the co-signing nurse is able to place an eSignature**    Nurse 2 eSignature: {Esignature:[id number]}

## 2022-10-10 NOTE — ED PROVIDER NOTES
ATTENDING PHYSICIAN NOTE       Date of evaluation: 10/10/2022    Chief Complaint     Chest Pain      History of Present Illness     Aimee Redman is a 78 y.o. male who presents with chest pain. Patient has history of hypertension and hyperlipidemia. Chest pain woke him up earlier this morning. It is epigastric, radiates to back bilaterally, constant, exertional, 8 out of 10. Reports feeling nauseous but denies any vomiting. Denies any fever, congestion, sore throat, visual disturbance, cough, shortness of breath, hemoptysis, palpitations, orthopnea, PND, bloody stool, dysuria, hematuria, change in urinary frequency, neck pain, neck stiffness, dizziness, syncope, weakness, or confusion. He did not take anything for symptoms. Denies prior episodes. Review of Systems     Review of Systems   All other systems reviewed and are negative. Past Medical, Surgical, Family, and Social History     He has a past medical history of Asthma and Hyperlipidemia. He has a past surgical history that includes hernia repair and Shoulder arthroscopy. His family history is not on file. He reports that he quit smoking about 39 years ago. He has never used smokeless tobacco. He reports that he does not drink alcohol and does not use drugs.     Medications     Previous Medications    ATORVASTATIN (LIPITOR) 40 MG TABLET    Take 40 mg by mouth daily    DICLOFENAC SODIUM (VOLTAREN) 1 % GEL    Apply 2 g topically 4 times daily as needed for Pain    ESOMEPRAZOLE MAGNESIUM (NEXIUM) 40 MG PACK    Take 40 mg by mouth daily    FLUTICASONE-SALMETEROL (ADVAIR) 100-50 MCG/DOSE DISKUS INHALER    Inhale 1 puff into the lungs every 12 hours    LISINOPRIL (PRINIVIL;ZESTRIL) 10 MG TABLET    Take 10 mg by mouth daily    ONDANSETRON (ZOFRAN ODT) 4 MG DISINTEGRATING TABLET    Take 1 tablet by mouth every 8 hours as needed for Nausea or Vomiting    TADALAFIL (CIALIS) 5 MG TABLET    take 1 tablet by oral route  every day    TAMSULOSIN (FLOMAX) 0.4 MG CAPSULE    Take 1 capsule by mouth daily       Allergies     He is allergic to bactrim [sulfamethoxazole-trimethoprim]. Physical Exam     INITIAL VITALS: BP: (!) 178/101, Temp: 98.7 °F (37.1 °C), Heart Rate: 72, Resp: 22, SpO2: 99 %   Physical Exam  Vitals and nursing note reviewed. Constitutional:       Appearance: He is not toxic-appearing or diaphoretic. HENT:      Head: Normocephalic and atraumatic. Right Ear: External ear normal.      Left Ear: External ear normal.      Nose: Nose normal.      Mouth/Throat:      Pharynx: Oropharynx is clear. No oropharyngeal exudate or posterior oropharyngeal erythema. Eyes:      General: No scleral icterus. Conjunctiva/sclera: Conjunctivae normal.   Cardiovascular:      Rate and Rhythm: Normal rate and regular rhythm. Pulses: Normal pulses. Heart sounds: Normal heart sounds. Pulmonary:      Effort: Pulmonary effort is normal.      Breath sounds: Normal breath sounds. No wheezing. Abdominal:      Palpations: Abdomen is soft. Tenderness: There is abdominal tenderness. There is no right CVA tenderness, left CVA tenderness, guarding or rebound. Musculoskeletal:         General: Normal range of motion. Cervical back: Normal range of motion and neck supple. No rigidity. Right lower leg: No edema. Left lower leg: No edema. Skin:     General: Skin is warm. Capillary Refill: Capillary refill takes less than 2 seconds. Coloration: Skin is not jaundiced. Neurological:      General: No focal deficit present. Mental Status: He is alert. Cranial Nerves: No cranial nerve deficit. Sensory: No sensory deficit. Motor: No weakness.       Coordination: Coordination normal.      Gait: Gait normal.   Psychiatric:         Mood and Affect: Mood normal.         Behavior: Behavior normal.       Diagnostic Results     EKG   I interpreted the EKG and note normal sinus rhythm, ventricular rate of 67, normal QRS and QTc duration, no STEMI criteria. RADIOLOGY:  US ABDOMEN LIMITED Specify organ? LIVER, GALLBLADDER   Final Result   Nonspecific gallbladder wall thickening. This could be secondary to acute   cholecystitis in the appropriate clinical scenario. De pendant echoes are   seen in the gallbladder either sludge or tiny stones      Mild pelvicaliectasis on right. CT ABDOMEN PELVIS W IV CONTRAST Additional Contrast? None   Final Result   Acute cholecystitis. XR CHEST PORTABLE   Final Result   No acute process.              LABS:   Results for orders placed or performed during the hospital encounter of 10/10/22   Comprehensive Metabolic Panel   Result Value Ref Range    Sodium 142 136 - 145 mmol/L    Potassium 4.0 3.5 - 5.1 mmol/L    Chloride 105 99 - 110 mmol/L    CO2 26 21 - 32 mmol/L    Anion Gap 11 3 - 16    Glucose 124 (H) 70 - 99 mg/dL    BUN 19 7 - 20 mg/dL    Creatinine 1.0 0.8 - 1.3 mg/dL    GFR Non-African American >60 >60    GFR African American >60 >60    Calcium 9.5 8.3 - 10.6 mg/dL    Total Protein 6.7 6.4 - 8.2 g/dL    Albumin 4.3 3.4 - 5.0 g/dL    Albumin/Globulin Ratio 1.8 1.1 - 2.2    Total Bilirubin 0.6 0.0 - 1.0 mg/dL    Alkaline Phosphatase 73 40 - 129 U/L    ALT 13 10 - 40 U/L    AST 16 15 - 37 U/L   CBC with Auto Differential   Result Value Ref Range    WBC 4.9 4.0 - 11.0 K/uL    RBC 4.77 4.20 - 5.90 M/uL    Hemoglobin 14.9 13.5 - 17.5 g/dL    Hematocrit 44.4 40.5 - 52.5 %    MCV 93.2 80.0 - 100.0 fL    MCH 31.2 26.0 - 34.0 pg    MCHC 33.5 31.0 - 36.0 g/dL    RDW 13.6 12.4 - 15.4 %    Platelets 749 690 - 932 K/uL    MPV 8.2 5.0 - 10.5 fL    Neutrophils % 62.9 %    Lymphocytes % 22.5 %    Monocytes % 10.6 %    Eosinophils % 3.0 %    Basophils % 1.0 %    Neutrophils Absolute 3.1 1.7 - 7.7 K/uL    Lymphocytes Absolute 1.1 1.0 - 5.1 K/uL    Monocytes Absolute 0.5 0.0 - 1.3 K/uL    Eosinophils Absolute 0.1 0.0 - 0.6 K/uL    Basophils Absolute 0.0 0.0 - 0.2 K/uL   Brain Natriuretic Peptide   Result Value Ref Range    Pro- 0 - 124 pg/mL   Troponin   Result Value Ref Range    Troponin <0.01 <0.01 ng/mL   Lactic Acid   Result Value Ref Range    Lactic Acid 0.8 0.4 - 2.0 mmol/L   Sample possible blood bank testing   Result Value Ref Range    Specimen Status MICHAEL    Lipase   Result Value Ref Range    Lipase 16.0 13.0 - 60.0 U/L   EKG 12 Lead   Result Value Ref Range    Ventricular Rate 67 BPM    Atrial Rate 67 BPM    P-R Interval 146 ms    QRS Duration 122 ms    Q-T Interval 414 ms    QTc Calculation (Bazett) 437 ms    P Axis 51 degrees    R Axis 66 degrees    T Axis 54 degrees    Diagnosis       Normal sinus rhythmRight bundle branch blockAbnormal ECGWhen compared with ECG of 14-JUL-2021 15:34,Right bundle branch block is now Present       ED BEDSIDE ULTRASOUND:  No results found. RECENT VITALS:  BP: (!) 160/90,Temp: 98.7 °F (37.1 °C), Heart Rate: 55, Resp: 16, SpO2: 98 %     Procedures         ED Course     Nursing Notes, Past Medical Hx, Past Surgical Hx, Social Hx,Allergies, and Family Hx were reviewed. patient was given the following medications:  Orders Placed This Encounter   Medications    ondansetron (ZOFRAN) injection 4 mg    morphine sulfate (PF) injection 4 mg    iopamidol (ISOVUE-370) 76 % injection 75 mL    morphine sulfate (PF) injection 4 mg    piperacillin-tazobactam (ZOSYN) 3,375 mg in dextrose 5 % 50 mL IVPB (mini-bag)     Order Specific Question:   Antimicrobial Indications     Answer:   Intra-Abdominal Infection    pantoprazole (PROTONIX) injection 40 mg    pantoprazole (PROTONIX) 40 MG injection     Ahmed Fan: cabinet override    ondansetron (ZOFRAN) 4 MG/2ML injection     Ahmed Fan: cabinet override    ondansetron (ZOFRAN) injection 4 mg       CONSULTS:  IP CONSULT TO [address] [place] [address] DECISIONMAKING / ASSESSMENT / Trey Kidney is a 78 y.o. male who presents with chest pain.   Patient presented hypertensive, afebrile, heart rate of 72, respiratory rate of 22 and satting at 99% on room air. Please see exam above. Given history and exam differential includes but is not limited to ACS, CHF, COPD, pneumonia, COVID-19, costochondritis, pericarditis, esophageal spasm. He has no ripping or tearing chest pain to suggest acute aortic dissection. Furthermore he has equal bilateral peripheral pulses and is neuro intact. I obtained labs and imaging studies as noted below. I interpreted the labs and note  CBC with normal white blood cell count, no evidence of anemia, normal platelets  CMP with normal electrolytes, hyperglycemic to 124, normal renal function, normal LFTs and T bili  Normal proBNP at 101  Troponin negative at less than 0.01    I interpreted the imaging and note  Chest x-ray with no acute cardiopulmonary change  CT abdomen pelvis concerning for acute cholecystitis  Right upper quadrant abdominal ultrasound notable for gallbladder wall thickening with possible sludge and tiny stones. Patient was updated with results. He was started on Zosyn. Given the above findings patient admitted to the hospital service. Patient is amenable with plan. Critical Care: I spent 12 minutes providing critical care. This time excludes time spent performing procedures but includes time spent on direct patient care, history retrieval, review of the chart, and discussions with patient, family, and consultant(s). Clinical Impression     1. Acute cholecystitis    2. Abdominal pain, epigastric        Disposition     PATIENT REFERRED TO:  No follow-up provider specified.     DISCHARGE MEDICATIONS:  New Prescriptions    No medications on file       DISPOSITION Decision To Admit 10/10/2022 07:40:49 AM          Brandyn Pepe MD  10/10/22 1012

## 2022-10-10 NOTE — PROGRESS NOTES
Pt admitted to Room 328. Pt a/o. VSS. Oriented to room. Denies c/o pain or nausea presently. Assessment complete as charted. Call light in reach. Denies other needs. Will continue to monitor.

## 2022-10-11 ENCOUNTER — TELEPHONE (OUTPATIENT)
Dept: SURGERY | Age: 67
End: 2022-10-11

## 2022-10-11 VITALS
HEIGHT: 71 IN | WEIGHT: 185 LBS | TEMPERATURE: 98.4 F | OXYGEN SATURATION: 98 % | HEART RATE: 71 BPM | RESPIRATION RATE: 16 BRPM | BODY MASS INDEX: 25.9 KG/M2 | SYSTOLIC BLOOD PRESSURE: 122 MMHG | DIASTOLIC BLOOD PRESSURE: 83 MMHG

## 2022-10-11 LAB
A/G RATIO: 1.7 (ref 1.1–2.2)
ALBUMIN SERPL-MCNC: 3.8 G/DL (ref 3.4–5)
ALP BLD-CCNC: 65 U/L (ref 40–129)
ALT SERPL-CCNC: 11 U/L (ref 10–40)
ANION GAP SERPL CALCULATED.3IONS-SCNC: 7 MMOL/L (ref 3–16)
AST SERPL-CCNC: 12 U/L (ref 15–37)
BASOPHILS ABSOLUTE: 0.1 K/UL (ref 0–0.2)
BASOPHILS RELATIVE PERCENT: 1.1 %
BILIRUB SERPL-MCNC: 0.8 MG/DL (ref 0–1)
BUN BLDV-MCNC: 14 MG/DL (ref 7–20)
CALCIUM SERPL-MCNC: 9.1 MG/DL (ref 8.3–10.6)
CHLORIDE BLD-SCNC: 105 MMOL/L (ref 99–110)
CO2: 27 MMOL/L (ref 21–32)
CREAT SERPL-MCNC: 0.9 MG/DL (ref 0.8–1.3)
EOSINOPHILS ABSOLUTE: 0.2 K/UL (ref 0–0.6)
EOSINOPHILS RELATIVE PERCENT: 3.6 %
GFR AFRICAN AMERICAN: >60
GFR NON-AFRICAN AMERICAN: >60
GLUCOSE BLD-MCNC: 98 MG/DL (ref 70–99)
HCT VFR BLD CALC: 42.9 % (ref 40.5–52.5)
HEMOGLOBIN: 13.7 G/DL (ref 13.5–17.5)
LYMPHOCYTES ABSOLUTE: 1.2 K/UL (ref 1–5.1)
LYMPHOCYTES RELATIVE PERCENT: 22.4 %
MCH RBC QN AUTO: 30.3 PG (ref 26–34)
MCHC RBC AUTO-ENTMCNC: 32 G/DL (ref 31–36)
MCV RBC AUTO: 94.5 FL (ref 80–100)
MONOCYTES ABSOLUTE: 0.4 K/UL (ref 0–1.3)
MONOCYTES RELATIVE PERCENT: 8.3 %
NEUTROPHILS ABSOLUTE: 3.4 K/UL (ref 1.7–7.7)
NEUTROPHILS RELATIVE PERCENT: 64.6 %
PDW BLD-RTO: 13.6 % (ref 12.4–15.4)
PLATELET # BLD: 230 K/UL (ref 135–450)
PMV BLD AUTO: 8.8 FL (ref 5–10.5)
POTASSIUM REFLEX MAGNESIUM: 4.1 MMOL/L (ref 3.5–5.1)
RBC # BLD: 4.54 M/UL (ref 4.2–5.9)
SODIUM BLD-SCNC: 139 MMOL/L (ref 136–145)
TOTAL PROTEIN: 6 G/DL (ref 6.4–8.2)
WBC # BLD: 5.3 K/UL (ref 4–11)

## 2022-10-11 PROCEDURE — 6370000000 HC RX 637 (ALT 250 FOR IP)

## 2022-10-11 PROCEDURE — G0378 HOSPITAL OBSERVATION PER HR: HCPCS

## 2022-10-11 PROCEDURE — 6360000002 HC RX W HCPCS

## 2022-10-11 PROCEDURE — 80053 COMPREHEN METABOLIC PANEL: CPT

## 2022-10-11 PROCEDURE — 85025 COMPLETE CBC W/AUTO DIFF WBC: CPT

## 2022-10-11 PROCEDURE — 36415 COLL VENOUS BLD VENIPUNCTURE: CPT

## 2022-10-11 PROCEDURE — APPSS30 APP SPLIT SHARED TIME 16-30 MINUTES: Performed by: CLINICAL NURSE SPECIALIST

## 2022-10-11 PROCEDURE — 2580000003 HC RX 258

## 2022-10-11 PROCEDURE — 96366 THER/PROPH/DIAG IV INF ADDON: CPT

## 2022-10-11 RX ORDER — METRONIDAZOLE 500 MG/1
500 TABLET ORAL 3 TIMES DAILY
Qty: 15 TABLET | Refills: 0 | Status: SHIPPED | OUTPATIENT
Start: 2022-10-11 | End: 2022-10-12 | Stop reason: ALTCHOICE

## 2022-10-11 RX ORDER — LISINOPRIL 10 MG/1
10 TABLET ORAL DAILY
Qty: 30 TABLET | Refills: 0 | Status: SHIPPED | OUTPATIENT
Start: 2022-10-11 | End: 2022-11-10

## 2022-10-11 RX ORDER — CIPROFLOXACIN 500 MG/1
500 TABLET, FILM COATED ORAL 2 TIMES DAILY
Qty: 10 TABLET | Refills: 0 | Status: SHIPPED | OUTPATIENT
Start: 2022-10-11 | End: 2022-10-12

## 2022-10-11 RX ADMIN — ATORVASTATIN CALCIUM 40 MG: 40 TABLET, FILM COATED ORAL at 09:37

## 2022-10-11 RX ADMIN — PIPERACILLIN AND TAZOBACTAM 3375 MG: 3; .375 INJECTION, POWDER, FOR SOLUTION INTRAVENOUS at 00:51

## 2022-10-11 RX ADMIN — PIPERACILLIN AND TAZOBACTAM 3375 MG: 3; .375 INJECTION, POWDER, FOR SOLUTION INTRAVENOUS at 09:39

## 2022-10-11 RX ADMIN — Medication 10 ML: at 09:37

## 2022-10-11 RX ADMIN — PANTOPRAZOLE SODIUM 40 MG: 40 TABLET, DELAYED RELEASE ORAL at 09:37

## 2022-10-11 RX ADMIN — LISINOPRIL 10 MG: 10 TABLET ORAL at 09:37

## 2022-10-11 ASSESSMENT — PAIN SCALES - GENERAL: PAINLEVEL_OUTOF10: 0

## 2022-10-11 NOTE — CARE COORDINATION
Case Management Assessment  Initial Evaluation    Date/Time of Evaluation: 10/11/2022 10:35 AM  Assessment Completed by: MIREYA Vera    If patient is discharged prior to next notation, then this note serves as note for discharge by case management. Patient Name: Mal Kam                   YOB: 1955  Diagnosis: Acute cholecystitis [K81.0]  Abdominal pain, epigastric [R10.13]                   Date / Time: 10/10/2022  5:43 AM    Patient Admission Status: Observation   Readmission Risk (Low < 19, Mod (19-27), High > 27): No data recorded  Current PCP: Marisa Briggs MD  PCP verified by CM? Yes    Chart Reviewed: Yes      History Provided by: Patient  Patient Orientation: Alert and Oriented, Place, Situation, Self, Person    Patient Cognition: Alert    Hospitalization in the last 30 days (Readmission):  No    If yes, Readmission Assessment in  Navigator will be completed. Advance Directives:      Code Status: Full Code   Patient's Primary Decision Maker is: Legal Next of Kin      Discharge Planning:    Patient lives with: Spouse/Significant Other, Children Type of Home: House  Primary Care Giver: Self  Patient Support Systems include: Spouse/Significant Other, Children   Current Financial resources: Medicare  Current community resources:    Current services prior to admission: None            Current DME:              Type of Home Care services:  None    ADLS  Prior functional level: Independent in ADLs/IADLs  Current functional level: Independent in ADLs/IADLs    Ambulating in room     Family can provide assistance at DC: Yes  Would you like Case Management to discuss the discharge plan with any other family members/significant others, and if so, who?  No  Plans to Return to Present Housing: Yes    Potential Assistance needed at discharge: N/A            Potential DME:    Patient expects to discharge to: [address] [place] [address] for transportation at discharge: Self    Financial    Payor: MEDICARE / Plan: MEDICARE PART A AND B / Product Type: *No Product type* /     Does insurance require precert for SNF: No    Potential assistance Purchasing Medications: No  Meds-to-Beds request: Yes      Elanajackeline Nava [address] [phone] - F [phone]  [address]  Phone: [phone] Fax: [phone]    [address] [place] [address] -  [phone] - F [phone]  [address] [facility]  [address] [facility] [address]  Phone: [phone] Fax: [phone]      Notes:    Factors facilitating achievement of predicted outcomes: Family support, Friend support, Motivated, Cooperative, Pleasant, Sense of humor, and Good insight into deficits    Barriers to discharge: pending diet     Additional Case Management Notes: Patient plans to return home with spouse, currently staying with dtr as just sold his house. Ora reports IPTA ambulating in room upon entry. Patient denies needs. The Plan for Transition of Care is related to the following treatment goals of Acute cholecystitis [K81.0]  Abdominal pain, epigastric [R10.13]      The Patient and/or Patient Representative Agree with the Discharge Plan?   Yes     Mitchell Zapien, [place]  Case Management Department  Ph: [phone] Fax: [phone]

## 2022-10-11 NOTE — PROGRESS NOTES
[facility]    PATIENT NAME: Lev Mantilla     TODAY'S DATE: 10/11/2022    CHIEF COMPLAINT: none    INTERVAL HISTORY/HPI:    Pt doing well, denies abd pain or nausea. Alyssa diet. .     OBJECTIVE:  VITALS:  /83   Pulse 71   Temp 98.4 °F (36.9 °C) (Oral)   Resp 16   Ht 5' 11\" (1.803 m)   Wt 185 lb (83.9 kg)   SpO2 98%   BMI 25.80 kg/m²     INTAKE/OUTPUT:    I/O last 3 completed shifts: In: 240 [P.O.:240]  Out: -   No intake/output data recorded. CONSTITUTIONAL:  awake and alert  LUNGS:  no crackles or wheezing  ABDOMEN:  normal bowel sounds, soft, non-distended, non-tender     Data:  CBC:   Recent Labs     10/10/22  0554 10/11/22  0555   WBC 4.9 5.3   HGB 14.9 13.7   HCT 44.4 42.9    230     BMP:    Recent Labs     10/10/22  0554 10/11/22  0555    139   K 4.0 4.1    105   CO2 26 27   BUN 19 14   CREATININE 1.0 0.9   GLUCOSE 124* 98     Hepatic:   Recent Labs     10/10/22  0554 10/11/22  0555   AST 16 12*   ALT 13 11   BILITOT 0.6 0.8   ALKPHOS 73 65         ASSESSMENT AND PLAN:  Acute calculous cholecystitis:   Symptoms resolved and pt tolerating diet. Ok to discharge to home on oral antibiotics and will plan for elective cholecystectomy in next 2-4 weeks.      Electronically signed by Abigail Flores, LUCIO - MAURILIO

## 2022-10-11 NOTE — TELEPHONE ENCOUNTER
Called patient to schedule surgery. Patient stated Louise Chadwick was in the middle of a couple things\" and would call me back in a day or two to schedule. 3 = A little assistance

## 2022-10-11 NOTE — PROGRESS NOTES
Discharge: Pt discharged to home as per order. IV removed. Pt to  prescriptions at outpt pharm. Instructions reviewed. Pt verbalized understanding. Denied questions. Ambulated off unit with wife per pt request in stable & ambulatory condition.

## 2022-10-11 NOTE — DISCHARGE SUMMARY
Discharge Summary    Patient ID: Mal Nab      Patient's PCP: Marisa Briggs MD    Admit Date: 10/10/2022     Discharge Date: 10/11/2022     Admitting Physician: Jaret Llamas MD     Discharge Physician: Ny Ritchie DO     Discharge Diagnoses: Active Hospital Problems    Diagnosis     Acute cholecystitis [K81.0]      Priority: Medium       The patient was seen and examined on day of discharge and this discharge summary is in conjunction with any daily progress note from day of discharge. Hospital Course: Mr. Kendrick Tse is a 78 y.o. male who presented to [facility] with epigastric pain. He has a past medical history of hypertension, hyperlipidemia, DM, asthma, GERD. He reports epigastric abdominal and chest pain that began morning of admission 10/10/22 around 3 AM with radiation to his back and left shoulder. He described a constant cramping pain rated 9/10 at its worst. Endorsed nausea without vomiting. Endorsed dizziness secondary to chronic vertigo. Denied constipation, diarrhea, fever, chills, cough, SOA, sore throat, hemoptysis, vision changes, SOA, palpitations, melena, hematochezia. No alcohol use or tobacco use, quit smoking 40 years ago. No other illicit substance use. Had a prior episode where he came to [facility] ED with similar presentation on 7/14/21. EKG revealed normal sinus rhythm with no STEMI, with troponins negative x2. CT abdomen showed acute cholecystitis with abdominal US revealing GB wall thickening with sludge and small stones. Patient was kept NPO and started on IV Zosyn. Surgery was consulted, recommending advancing diet to clear liquids first, then low fat diet with monitoring. Patient tolerated diet advancement well and was discharged asymptomatic on PO ciprofloxacin and metronidazole antibiotics with follow-up with general surgery for elective cholecystectomy.       Physical Exam Performed:     /83   Pulse 71   Temp 98.4 °F (36.9 °C) (Oral)   Resp 16   Ht 5' 11\" (1.803 m)   Wt 185 lb (83.9 kg)   SpO2 98%   BMI 25.80 kg/m²       General appearance:  No apparent distress, appears stated age and cooperative. HEENT:  Normal cephalic, atraumatic without obvious deformity. Pupils equal, round, and reactive to light. Extra ocular muscles intact. Conjunctivae/corneas clear. Neck: Supple, with full range of motion. No jugular venous distention. Trachea midline. Respiratory:  Normal respiratory effort. Clear to auscultation, bilaterally without Rales/Wheezes/Rhonchi. Cardiovascular:  Regular rate and rhythm with normal S1/S2 without murmurs, rubs or gallops. Abdomen: Soft, non-tender, non-distended with normal bowel sounds. Musculoskeletal:  No clubbing, cyanosis or edema bilaterally. Full range of motion without deformity. Skin: Skin color, texture, turgor normal.  No rashes or lesions. Neurologic:  Neurovascularly intact without any focal sensory/motor deficits. Cranial nerves: II-XII intact, grossly non-focal.  Psychiatric:  Alert and oriented, thought content appropriate, normal insight  Capillary Refill: Brisk,< 3 seconds   Peripheral Pulses: +2 palpable, equal bilaterally       Labs: For convenience and continuity at follow-up the following most recent labs are provided:      CBC:    Lab Results   Component Value Date/Time    WBC 5.3 10/11/2022 05:55 AM    HGB 13.7 10/11/2022 05:55 AM    HCT 42.9 10/11/2022 05:55 AM     10/11/2022 05:55 AM       Renal:    Lab Results   Component Value Date/Time     10/11/2022 05:55 AM    K 4.1 10/11/2022 05:55 AM     10/11/2022 05:55 AM    CO2 27 10/11/2022 05:55 AM    BUN 14 10/11/2022 05:55 AM    CREATININE 0.9 10/11/2022 05:55 AM    CALCIUM 9.1 10/11/2022 05:55 AM         Significant Diagnostic Studies    Radiology:   [address] [place] organ? LIVER, GALLBLADDER   Final Result   Nonspecific gallbladder wall thickening.   This could be secondary to acute   cholecystitis in the appropriate clinical scenario. De pendant echoes are   seen in the gallbladder either sludge or tiny stones      Mild pelvicaliectasis on right. CT ABDOMEN PELVIS W IV CONTRAST Additional Contrast? None   Final Result   Acute cholecystitis. XR CHEST PORTABLE   Final Result   No acute process. Consults:     IP CONSULT TO GENERAL SURGERY    Disposition:  home     Condition at Discharge: Stable    Discharge Instructions/Follow-up:  continue low fat diet, continue antibiotic therapy, follow up with general surgery for elective cholecystectomy    Code Status:  Prior Full code    Activity: activity as tolerated    Diet: regular diet      Discharge Medications:     Discharge Medication List as of 10/11/2022 12:20 PM             Details   ciprofloxacin (CIPRO) 500 MG tablet Take 1 tablet by mouth 2 times daily for 5 days, Disp-10 tablet, R-0Normal      metroNIDAZOLE (FLAGYL) 500 MG tablet Take 1 tablet by mouth 3 times daily for 5 days, Disp-15 tablet, R-0Normal                Details   lisinopril (PRINIVIL;ZESTRIL) 10 MG tablet Take 1 tablet by mouth daily, Disp-30 tablet, R-0Normal                Details   albuterol sulfate (PROAIR RESPICLICK) 342 (90 Base) MCG/ACT aerosol powder inhalation Inhale into the lungs every 6 hours as needed for Wheezing or Shortness of BreathHistorical Med      atorvastatin (LIPITOR) 40 MG tablet Take 40 mg by mouth dailyHistorical Med      esomeprazole Magnesium (NEXIUM) 40 MG PACK Take 40 mg by mouth daily      fluticasone-salmeterol (ADVAIR) 100-50 MCG/DOSE diskus inhaler Inhale 1 puff into the lungs every 12 hours             Time Spent on discharge is more than 30 minutes in the examination, evaluation, counseling and review of medications and discharge plan. Signed:    Ny Ritchie DO   10/11/2022      Thank you Marisa Briggs MD for the opportunity to be involved in this patient's care.  If you have any questions or concerns please feel free to contact me at [phone].

## 2022-10-11 NOTE — PLAN OF CARE
Has denied pain & nausea this shift. Tolerated clear liquids well. Advanced diet as ordered. Tolerated applesauce & pretzels well.   Problem: Pain  Goal: Verbalizes/displays adequate comfort level or baseline comfort level  Outcome: Progressing

## 2022-10-12 ENCOUNTER — TELEPHONE (OUTPATIENT)
Dept: SURGERY | Age: 67
End: 2022-10-12

## 2022-10-12 RX ORDER — AMOXICILLIN AND CLAVULANATE POTASSIUM 875; 125 MG/1; MG/1
1 TABLET, FILM COATED ORAL 2 TIMES DAILY
Qty: 14 TABLET | Refills: 0 | Status: SHIPPED | OUTPATIENT
Start: 2022-10-12 | End: 2022-10-19

## 2022-10-12 NOTE — TELEPHONE ENCOUNTER
Patient is scheduled for DV Cholecystectomy with Intraoperative Cholangiogram, possible open with Dr. Sachin Valle on 10/25/22 @ 12:00pm with arrival time of 10:00am, A Main - NPO after midnight davin before surgery - Patient will need  day of surgery - Dr. Lauren Yeh to complete pre-op physical - Patient understood and agreed with above noted.

## 2022-10-12 NOTE — PROGRESS NOTES
Brittney Quitter    Age 78 y.o.    male    1955    MRN [id number]    10/25/2022  Arrival Time_____________  OR Time____________175 Min     Procedure(s):  ROBOTIC CHOLECYSTECTOMY WITH INTRAOPERATIVE CHOLANGIOGRAM, POSSIBLE OPEN PROCEDURE                      General   Surgeon(s):  Neal Zamarripa, MD      DAY ADMIT ___  SDS/OP ___  OUTPT IN BED ___        Phone [phone] (home) [phone] (work)    PCP _____________________ Phone_________________ [address] ( ) Epic CE ( ) Appt ________    ADDITIONAL INFO __________________________________ Cardio/Consult _____________    NOTES _____________________________________________________________________    ____________________________________________________________________________    PAT APPT DATE:________ TIME: ________  FAXED QAD: _______  (__) H&P w/ Hospitalist  __________________________________________________________________________  Preop Nurse phone screen complete: _____________  (__) CBC     (__) W/ DIFF ___________     (__) Hgb A1C    ___________  (__) CHEST X RAY   __________  (__) LIPID PROFILE  ___________  (__) EKG   __________  (__) PT/PTT   ___________  (__) PFT's   __________  (__) BMP   ___________  (__) CAROTIDS  __________  (__) CMP   ___________  (__) VEIN MAPPING  __________  (__) U/A   ___________  (__) HISTORY & PHYSICAL __________  (__) URINE C & S  ___________  (__) CARDIAC CLEARANCE __________  (__) U/A W/ FLEX  ___________  (__) PULM.  CLEARANCE __________  (__) SERUM PREGNANCY ___________  (__) Check Epic DOS orders __________  (__) TYPE & SCREEN __________repeat ( ) (__)  __________________ __________  (__) ALBUMIN [place] [facility] ___________  (__)  __________________ __________  (__) TRANSFERRIN  ___________  (__)  __________________ __________  (__) LIVER PROFILE  ___________  (__)  __________________ __________  (__) MRSA NASAL SWAB ___________  (__) URINE PREG DOS __________  (__) SED RATE  ___________  (__) BLOOD SUGAR DOS __________  (__) C-REACTIVE PROTEIN ___________    (__) VITAMIN D HYDROXY ___________  (__) BLOOD THINNERS __________    (__) ACE/ ARBS: _____________________     (__) BETABLOCKERS __________________

## 2022-10-12 NOTE — TELEPHONE ENCOUNTER
Patient calling, was seen in ED for cholecystitis and prescribed Cipro. Patient states he will not take this medication due to potential side effects. Sent Dr. Shayla Mayer perfect serve message. Will return patient's call once response comes for physician.

## 2022-10-19 NOTE — PROGRESS NOTES
1. Do not eat or drink anything after 12 midnight prior to surgery. This includes no water, chewing gum mints, or ice chips. You may brush your teeth and gargle the day of surgery but DO NOT SWALLOW THE WATER. 2. Please see your family doctor/pediatrician for a history and physical and/or concerning medications. Bring any test results/reports from your physician's office. If you are under the care of a heart doctor or specialist please be aware that you may be asked to see him or her for clearance. 3. You may be asked to stop blood thinners such as Coumadin, Plavix, Fragmin, and Lovenox or Anti-inflammatories such as Aspirin, Ibuprofen, Advil, and Naproxen prior to your surgery. Please check with your doctor before stopping these or any other medications. 4. Do not smoke, and do not drink any alcoholic beverages 24 hours prior to surgery. 5. You MUST make arrangements for a responsible adult to take you home after your surgery. For your safety, you will not be allowed to leave alone or drive yourself home. Your surgery will be cancelled if you do not have a ride home. Also for your safety, it is strongly suggested someone stay with you the first 24 hrs after your surgery. 6. A parent/legal guardian must accompany a child scheduled for surgery and plan to stay at the hospital until the child is discharged. Please do not bring other children with you. 7. For your comfort,please wear simple, loose fitting clothing to the hospital.  Please do not bring valuables (money, credit cards, checkbooks, etc.) Do not wear any makeup (including no eye makeup) or nail polish on your fingers or toes. 8. For your safety, please DO NOT wear any jewelry or piercings on day of surgery. All body piercing jewelry must be removed. 9. If you have dentures, they will be removed before going to the OR; for your convenience we will provide you with a container.   If you wear contact lenses or glasses, they will be removed, they will be removed, please bring a case for them. 10. If appicable,Please see your family doctor/pediatrician for a history & physical and/or concerning medications. Bring any test results/reports from your physician's office. 11. Remember to bring Blood Bank bracelet to the hospital on the day of surgery. 12. If you have a Living Will and Durable Power of  for Healthcare, please bring in a copy. 15. Notify your Surgeon if you develop any illness between now and surgery  time, cough, cold, fever, sore throat, nausea, vomiting, etc.  Please notify your surgeon if you experience dizziness, shortness of breath or blurred vision between now & the time of your surgery   14. DO NOT shave your operative site 96 hours prior to surgery. For face & neck surgery, men may use an electric razor 48 hours prior to surgery. 15. Shower the night before surgery with ___Antibacterial soap ___Hibiclens   16. To provide excellent care visitors will be limited to one in the room at any given time. 17.  Please bring picture ID and insurance card. 18.  Visit our web site for additional information:  [employer]/surgery.

## 2022-10-21 ENCOUNTER — ANESTHESIA EVENT (OUTPATIENT)
Dept: OPERATING ROOM | Age: 67
End: 2022-10-21
Payer: MEDICARE

## 2022-10-25 ENCOUNTER — APPOINTMENT (OUTPATIENT)
Dept: GENERAL RADIOLOGY | Age: 67
End: 2022-10-25
Attending: SURGERY
Payer: MEDICARE

## 2022-10-25 ENCOUNTER — HOSPITAL ENCOUNTER (OUTPATIENT)
Age: 67
Setting detail: OUTPATIENT SURGERY
Discharge: HOME | End: 2022-10-25
Attending: SURGERY | Admitting: SURGERY
Payer: MEDICARE

## 2022-10-25 ENCOUNTER — ANESTHESIA (OUTPATIENT)
Dept: OPERATING ROOM | Age: 67
End: 2022-10-25
Payer: MEDICARE

## 2022-10-25 VITALS
OXYGEN SATURATION: 92 % | HEART RATE: 86 BPM | WEIGHT: 185 LBS | DIASTOLIC BLOOD PRESSURE: 71 MMHG | RESPIRATION RATE: 19 BRPM | TEMPERATURE: 96.8 F | HEIGHT: 71 IN | SYSTOLIC BLOOD PRESSURE: 129 MMHG | BODY MASS INDEX: 25.9 KG/M2

## 2022-10-25 DIAGNOSIS — K81.0 ACUTE CHOLECYSTITIS: ICD-10-CM

## 2022-10-25 DIAGNOSIS — G89.18 ACUTE POST-OPERATIVE PAIN: Primary | ICD-10-CM

## 2022-10-25 LAB
EKG ATRIAL RATE: 67 BPM
EKG DIAGNOSIS: NORMAL
EKG P AXIS: 35 DEGREES
EKG P-R INTERVAL: 138 MS
EKG Q-T INTERVAL: 408 MS
EKG QRS DURATION: 116 MS
EKG QTC CALCULATION (BAZETT): 431 MS
EKG R AXIS: 38 DEGREES
EKG T AXIS: 25 DEGREES
EKG VENTRICULAR RATE: 67 BPM

## 2022-10-25 PROCEDURE — 74300 X-RAY BILE DUCTS/PANCREAS: CPT

## 2022-10-25 PROCEDURE — 3600000009 HC SURGERY ROBOT BASE: Performed by: SURGERY

## 2022-10-25 PROCEDURE — 2500000003 HC RX 250 WO HCPCS: Performed by: NURSE ANESTHETIST, CERTIFIED REGISTERED

## 2022-10-25 PROCEDURE — 3209999900 FLUORO FOR SURGICAL PROCEDURES

## 2022-10-25 PROCEDURE — 6360000004 HC RX CONTRAST MEDICATION: Performed by: SURGERY

## 2022-10-25 PROCEDURE — S2900 ROBOTIC SURGICAL SYSTEM: HCPCS | Performed by: SURGERY

## 2022-10-25 PROCEDURE — 74300 X-RAY BILE DUCTS/PANCREAS: CPT | Performed by: SURGERY

## 2022-10-25 PROCEDURE — 7100000001 HC PACU RECOVERY - ADDTL 15 MIN: Performed by: SURGERY

## 2022-10-25 PROCEDURE — 2580000003 HC RX 258: Performed by: NURSE ANESTHETIST, CERTIFIED REGISTERED

## 2022-10-25 PROCEDURE — 2709999900 HC NON-CHARGEABLE SUPPLY: Performed by: SURGERY

## 2022-10-25 PROCEDURE — 93005 ELECTROCARDIOGRAM TRACING: CPT | Performed by: ANESTHESIOLOGY

## 2022-10-25 PROCEDURE — 2500000003 HC RX 250 WO HCPCS: Performed by: ANESTHESIOLOGY

## 2022-10-25 PROCEDURE — 3700000001 HC ADD 15 MINUTES (ANESTHESIA): Performed by: SURGERY

## 2022-10-25 PROCEDURE — 6370000000 HC RX 637 (ALT 250 FOR IP): Performed by: ANESTHESIOLOGY

## 2022-10-25 PROCEDURE — 3700000000 HC ANESTHESIA ATTENDED CARE: Performed by: SURGERY

## 2022-10-25 PROCEDURE — 7100000010 HC PHASE II RECOVERY - FIRST 15 MIN: Performed by: SURGERY

## 2022-10-25 PROCEDURE — 3600000019 HC SURGERY ROBOT ADDTL 15MIN: Performed by: SURGERY

## 2022-10-25 PROCEDURE — 7100000011 HC PHASE II RECOVERY - ADDTL 15 MIN: Performed by: SURGERY

## 2022-10-25 PROCEDURE — 2500000003 HC RX 250 WO HCPCS: Performed by: SURGERY

## 2022-10-25 PROCEDURE — 6360000002 HC RX W HCPCS

## 2022-10-25 PROCEDURE — 88304 TISSUE EXAM BY PATHOLOGIST: CPT

## 2022-10-25 PROCEDURE — 7100000000 HC PACU RECOVERY - FIRST 15 MIN: Performed by: SURGERY

## 2022-10-25 PROCEDURE — 6360000002 HC RX W HCPCS: Performed by: SURGERY

## 2022-10-25 PROCEDURE — 6360000002 HC RX W HCPCS: Performed by: ANESTHESIOLOGY

## 2022-10-25 PROCEDURE — 6360000002 HC RX W HCPCS: Performed by: NURSE ANESTHETIST, CERTIFIED REGISTERED

## 2022-10-25 PROCEDURE — 47563 LAPARO CHOLECYSTECTOMY/GRAPH: CPT | Performed by: SURGERY

## 2022-10-25 RX ORDER — OXYCODONE HYDROCHLORIDE 5 MG/1
10 TABLET ORAL PRN
Status: COMPLETED | OUTPATIENT
Start: 2022-10-25 | End: 2022-10-25

## 2022-10-25 RX ORDER — ONDANSETRON 2 MG/ML
4 INJECTION INTRAMUSCULAR; INTRAVENOUS
Status: COMPLETED | OUTPATIENT
Start: 2022-10-25 | End: 2022-10-25

## 2022-10-25 RX ORDER — SODIUM CHLORIDE, SODIUM LACTATE, POTASSIUM CHLORIDE, CALCIUM CHLORIDE 600; 310; 30; 20 MG/100ML; MG/100ML; MG/100ML; MG/100ML
INJECTION, SOLUTION INTRAVENOUS CONTINUOUS
Status: DISCONTINUED | OUTPATIENT
Start: 2022-10-25 | End: 2022-10-25 | Stop reason: HOSPADM

## 2022-10-25 RX ORDER — OXYCODONE HYDROCHLORIDE 5 MG/1
5 TABLET ORAL EVERY 6 HOURS PRN
Qty: 20 TABLET | Refills: 0 | Status: SHIPPED | OUTPATIENT
Start: 2022-10-25 | End: 2022-10-30

## 2022-10-25 RX ORDER — PHENYLEPHRINE HCL IN 0.9% NACL 1 MG/10 ML
SYRINGE (ML) INTRAVENOUS PRN
Status: DISCONTINUED | OUTPATIENT
Start: 2022-10-25 | End: 2022-10-25 | Stop reason: SDUPTHER

## 2022-10-25 RX ORDER — DEXAMETHASONE SODIUM PHOSPHATE 4 MG/ML
INJECTION, SOLUTION INTRA-ARTICULAR; INTRALESIONAL; INTRAMUSCULAR; INTRAVENOUS; SOFT TISSUE PRN
Status: DISCONTINUED | OUTPATIENT
Start: 2022-10-25 | End: 2022-10-25 | Stop reason: SDUPTHER

## 2022-10-25 RX ORDER — BUPIVACAINE HYDROCHLORIDE AND EPINEPHRINE 5; 5 MG/ML; UG/ML
INJECTION, SOLUTION PERINEURAL PRN
Status: DISCONTINUED | OUTPATIENT
Start: 2022-10-25 | End: 2022-10-25 | Stop reason: HOSPADM

## 2022-10-25 RX ORDER — EPHEDRINE SULFATE 50 MG/ML
INJECTION INTRAVENOUS PRN
Status: DISCONTINUED | OUTPATIENT
Start: 2022-10-25 | End: 2022-10-25 | Stop reason: SDUPTHER

## 2022-10-25 RX ORDER — FAMOTIDINE 10 MG/ML
20 INJECTION, SOLUTION INTRAVENOUS ONCE
Status: COMPLETED | OUTPATIENT
Start: 2022-10-25 | End: 2022-10-25

## 2022-10-25 RX ORDER — ONDANSETRON 2 MG/ML
4 INJECTION INTRAMUSCULAR; INTRAVENOUS ONCE
Status: DISCONTINUED | OUTPATIENT
Start: 2022-10-25 | End: 2022-10-26 | Stop reason: HOSPADM

## 2022-10-25 RX ORDER — DOCUSATE SODIUM 100 MG/1
100 CAPSULE, LIQUID FILLED ORAL 2 TIMES DAILY
Qty: 30 CAPSULE | Refills: 0 | Status: SHIPPED | OUTPATIENT
Start: 2022-10-25 | End: 2022-11-08

## 2022-10-25 RX ORDER — SODIUM CHLORIDE 0.9 % (FLUSH) 0.9 %
5-40 SYRINGE (ML) INJECTION EVERY 12 HOURS SCHEDULED
Status: DISCONTINUED | OUTPATIENT
Start: 2022-10-25 | End: 2022-10-26 | Stop reason: HOSPADM

## 2022-10-25 RX ORDER — ONDANSETRON 2 MG/ML
INJECTION INTRAMUSCULAR; INTRAVENOUS
Status: COMPLETED
Start: 2022-10-25 | End: 2022-10-25

## 2022-10-25 RX ORDER — SODIUM CHLORIDE 0.9 % (FLUSH) 0.9 %
5-40 SYRINGE (ML) INJECTION EVERY 12 HOURS SCHEDULED
Status: DISCONTINUED | OUTPATIENT
Start: 2022-10-25 | End: 2022-10-25 | Stop reason: HOSPADM

## 2022-10-25 RX ORDER — MEPERIDINE HYDROCHLORIDE 50 MG/ML
12.5 INJECTION INTRAMUSCULAR; INTRAVENOUS; SUBCUTANEOUS EVERY 5 MIN PRN
Status: DISCONTINUED | OUTPATIENT
Start: 2022-10-25 | End: 2022-10-26 | Stop reason: HOSPADM

## 2022-10-25 RX ORDER — APREPITANT 40 MG/1
40 CAPSULE ORAL ONCE
Status: COMPLETED | OUTPATIENT
Start: 2022-10-25 | End: 2022-10-25

## 2022-10-25 RX ORDER — EPINEPHRINE 1 MG/ML
INJECTION, SOLUTION, CONCENTRATE INTRAVENOUS PRN
Status: DISCONTINUED | OUTPATIENT
Start: 2022-10-25 | End: 2022-10-25 | Stop reason: SDUPTHER

## 2022-10-25 RX ORDER — FENTANYL CITRATE 50 UG/ML
INJECTION, SOLUTION INTRAMUSCULAR; INTRAVENOUS PRN
Status: DISCONTINUED | OUTPATIENT
Start: 2022-10-25 | End: 2022-10-25 | Stop reason: SDUPTHER

## 2022-10-25 RX ORDER — SODIUM CHLORIDE 0.9 % (FLUSH) 0.9 %
5-40 SYRINGE (ML) INJECTION PRN
Status: DISCONTINUED | OUTPATIENT
Start: 2022-10-25 | End: 2022-10-25 | Stop reason: HOSPADM

## 2022-10-25 RX ORDER — ONDANSETRON 2 MG/ML
INJECTION INTRAMUSCULAR; INTRAVENOUS
Status: DISCONTINUED
Start: 2022-10-25 | End: 2022-10-26 | Stop reason: HOSPADM

## 2022-10-25 RX ORDER — SODIUM CHLORIDE 9 MG/ML
INJECTION, SOLUTION INTRAVENOUS PRN
Status: DISCONTINUED | OUTPATIENT
Start: 2022-10-25 | End: 2022-10-25 | Stop reason: HOSPADM

## 2022-10-25 RX ORDER — ONDANSETRON 2 MG/ML
INJECTION INTRAMUSCULAR; INTRAVENOUS PRN
Status: DISCONTINUED | OUTPATIENT
Start: 2022-10-25 | End: 2022-10-25 | Stop reason: SDUPTHER

## 2022-10-25 RX ORDER — MEPERIDINE HYDROCHLORIDE 50 MG/ML
INJECTION INTRAMUSCULAR; INTRAVENOUS; SUBCUTANEOUS
Status: COMPLETED
Start: 2022-10-25 | End: 2022-10-25

## 2022-10-25 RX ORDER — PROPOFOL 10 MG/ML
INJECTION, EMULSION INTRAVENOUS PRN
Status: DISCONTINUED | OUTPATIENT
Start: 2022-10-25 | End: 2022-10-25 | Stop reason: SDUPTHER

## 2022-10-25 RX ORDER — SODIUM CHLORIDE 0.9 % (FLUSH) 0.9 %
5-40 SYRINGE (ML) INJECTION PRN
Status: DISCONTINUED | OUTPATIENT
Start: 2022-10-25 | End: 2022-10-26 | Stop reason: HOSPADM

## 2022-10-25 RX ORDER — LIDOCAINE HYDROCHLORIDE 20 MG/ML
INJECTION, SOLUTION EPIDURAL; INFILTRATION; INTRACAUDAL; PERINEURAL PRN
Status: DISCONTINUED | OUTPATIENT
Start: 2022-10-25 | End: 2022-10-25 | Stop reason: SDUPTHER

## 2022-10-25 RX ORDER — SODIUM CHLORIDE 9 MG/ML
25 INJECTION, SOLUTION INTRAVENOUS PRN
Status: DISCONTINUED | OUTPATIENT
Start: 2022-10-25 | End: 2022-10-26 | Stop reason: HOSPADM

## 2022-10-25 RX ORDER — INDOCYANINE GREEN AND WATER 25 MG
5 KIT INJECTION ONCE
Status: COMPLETED | OUTPATIENT
Start: 2022-10-25 | End: 2022-10-25

## 2022-10-25 RX ORDER — ROCURONIUM BROMIDE 10 MG/ML
INJECTION, SOLUTION INTRAVENOUS PRN
Status: DISCONTINUED | OUTPATIENT
Start: 2022-10-25 | End: 2022-10-25 | Stop reason: SDUPTHER

## 2022-10-25 RX ORDER — HYDROMORPHONE HCL 110MG/55ML
PATIENT CONTROLLED ANALGESIA SYRINGE INTRAVENOUS PRN
Status: DISCONTINUED | OUTPATIENT
Start: 2022-10-25 | End: 2022-10-25 | Stop reason: SDUPTHER

## 2022-10-25 RX ORDER — SODIUM CHLORIDE, SODIUM LACTATE, POTASSIUM CHLORIDE, CALCIUM CHLORIDE 600; 310; 30; 20 MG/100ML; MG/100ML; MG/100ML; MG/100ML
INJECTION, SOLUTION INTRAVENOUS CONTINUOUS PRN
Status: DISCONTINUED | OUTPATIENT
Start: 2022-10-25 | End: 2022-10-25 | Stop reason: SDUPTHER

## 2022-10-25 RX ORDER — OXYCODONE HYDROCHLORIDE 5 MG/1
5 TABLET ORAL PRN
Status: COMPLETED | OUTPATIENT
Start: 2022-10-25 | End: 2022-10-25

## 2022-10-25 RX ADMIN — Medication 40 MCG: at 17:02

## 2022-10-25 RX ADMIN — PROPOFOL 200 MG: 10 INJECTION, EMULSION INTRAVENOUS at 16:21

## 2022-10-25 RX ADMIN — HYDROMORPHONE HYDROCHLORIDE 0.5 MG: 1 INJECTION, SOLUTION INTRAMUSCULAR; INTRAVENOUS; SUBCUTANEOUS at 18:56

## 2022-10-25 RX ADMIN — SUGAMMADEX 200 MG: 100 INJECTION, SOLUTION INTRAVENOUS at 17:52

## 2022-10-25 RX ADMIN — MEPERIDINE HYDROCHLORIDE 12.5 MG: 50 INJECTION INTRAMUSCULAR; INTRAVENOUS; SUBCUTANEOUS at 18:40

## 2022-10-25 RX ADMIN — ONDANSETRON 4 MG: 2 INJECTION INTRAMUSCULAR; INTRAVENOUS at 20:45

## 2022-10-25 RX ADMIN — EPINEPHRINE 5 MCG: 1 INJECTION, SOLUTION, CONCENTRATE INTRAVENOUS at 17:02

## 2022-10-25 RX ADMIN — ONDANSETRON 4 MG: 2 INJECTION INTRAMUSCULAR; INTRAVENOUS at 16:21

## 2022-10-25 RX ADMIN — HYDROMORPHONE HYDROCHLORIDE 0.8 MG: 2 INJECTION INTRAMUSCULAR; INTRAVENOUS; SUBCUTANEOUS at 16:44

## 2022-10-25 RX ADMIN — DEXAMETHASONE SODIUM PHOSPHATE 10 MG: 4 INJECTION, SOLUTION INTRAMUSCULAR; INTRAVENOUS at 16:21

## 2022-10-25 RX ADMIN — HYDROMORPHONE HYDROCHLORIDE 0.5 MG: 1 INJECTION, SOLUTION INTRAMUSCULAR; INTRAVENOUS; SUBCUTANEOUS at 18:10

## 2022-10-25 RX ADMIN — HYDROMORPHONE HYDROCHLORIDE 0.5 MG: 1 INJECTION, SOLUTION INTRAMUSCULAR; INTRAVENOUS; SUBCUTANEOUS at 18:30

## 2022-10-25 RX ADMIN — FAMOTIDINE 20 MG: 10 INJECTION, SOLUTION INTRAVENOUS at 12:20

## 2022-10-25 RX ADMIN — MEPERIDINE HYDROCHLORIDE 12.5 MG: 50 INJECTION, SOLUTION INTRAMUSCULAR; INTRAVENOUS; SUBCUTANEOUS at 18:40

## 2022-10-25 RX ADMIN — APREPITANT 40 MG: 40 CAPSULE ORAL at 12:19

## 2022-10-25 RX ADMIN — SODIUM CHLORIDE, SODIUM LACTATE, POTASSIUM CHLORIDE, AND CALCIUM CHLORIDE: .6; .31; .03; .02 INJECTION, SOLUTION INTRAVENOUS at 16:21

## 2022-10-25 RX ADMIN — ONDANSETRON 4 MG: 2 INJECTION INTRAMUSCULAR; INTRAVENOUS at 18:31

## 2022-10-25 RX ADMIN — HYDROMORPHONE HYDROCHLORIDE 0.8 MG: 2 INJECTION INTRAMUSCULAR; INTRAVENOUS; SUBCUTANEOUS at 17:58

## 2022-10-25 RX ADMIN — EPINEPHRINE 5 MCG: 1 INJECTION, SOLUTION, CONCENTRATE INTRAVENOUS at 17:04

## 2022-10-25 RX ADMIN — INDOCYANINE GREEN AND WATER 5 MG: KIT at 16:09

## 2022-10-25 RX ADMIN — FENTANYL CITRATE 100 MCG: 50 INJECTION INTRAMUSCULAR; INTRAVENOUS at 16:21

## 2022-10-25 RX ADMIN — CEFAZOLIN 2 G: 10 INJECTION, POWDER, FOR SOLUTION INTRAVENOUS at 16:21

## 2022-10-25 RX ADMIN — LIDOCAINE HYDROCHLORIDE 100 MG: 20 INJECTION, SOLUTION EPIDURAL; INFILTRATION; INTRACAUDAL; PERINEURAL at 16:21

## 2022-10-25 RX ADMIN — HYDROMORPHONE HYDROCHLORIDE 0.5 MG: 1 INJECTION, SOLUTION INTRAMUSCULAR; INTRAVENOUS; SUBCUTANEOUS at 18:22

## 2022-10-25 RX ADMIN — EPHEDRINE SULFATE 10 MG: 50 INJECTION INTRAVENOUS at 17:07

## 2022-10-25 RX ADMIN — OXYCODONE 5 MG: 5 TABLET ORAL at 19:41

## 2022-10-25 RX ADMIN — ROCURONIUM BROMIDE 50 MG: 10 SOLUTION INTRAVENOUS at 16:21

## 2022-10-25 RX ADMIN — Medication 40 MCG: at 17:05

## 2022-10-25 ASSESSMENT — PAIN SCALES - GENERAL
PAINLEVEL_OUTOF10: 7
PAINLEVEL_OUTOF10: 7
PAINLEVEL_OUTOF10: 6
PAINLEVEL_OUTOF10: 0

## 2022-10-25 ASSESSMENT — PAIN DESCRIPTION - DESCRIPTORS
DESCRIPTORS: ACHING;DISCOMFORT
DESCRIPTORS: ACHING;GNAWING
DESCRIPTORS: ACHING;DULL

## 2022-10-25 ASSESSMENT — PAIN DESCRIPTION - ORIENTATION
ORIENTATION: MID

## 2022-10-25 ASSESSMENT — PAIN DESCRIPTION - LOCATION
LOCATION: ABDOMEN

## 2022-10-25 ASSESSMENT — ENCOUNTER SYMPTOMS: SHORTNESS OF BREATH: 0

## 2022-10-25 ASSESSMENT — LIFESTYLE VARIABLES: SMOKING_STATUS: 0

## 2022-10-25 NOTE — ANESTHESIA POSTPROCEDURE EVALUATION
Department of Anesthesiology  Postprocedure Note    Patient: Maynor Tapia  MRN: [id number]  YOB: 1955  Date of evaluation: 10/25/2022      Procedure Summary     Date: 10/25/22 Room / Location: [address]    Anesthesia Start: 1619 Anesthesia Stop: 1800    Procedure: ROBOTIC CHOLECYSTECTOMY WITH INTRAOPERATIVE CHOLANGIOGRAM (Abdomen) Diagnosis:       Acute cholecystitis      (ACUTE CHOLECYSTITIS)    Surgeons: Erica Mortensen MD Responsible Provider: Kate Waldron MD    Anesthesia Type: general ASA Status: 2          Anesthesia Type: No value filed.     Helen Phase I: Helen Score: 9    Helen Phase II:      Vitals:    10/25/22 1913 10/25/22 1914 10/25/22 1915 10/25/22 1916   BP: 124/80      Pulse: 78 86 83 85   Resp: 14 25 18 25   Temp:       TempSrc:       SpO2: 97% 97% 97% 98%   Weight:       Height:         Anesthesia Post Evaluation    Patient location during evaluation: bedside  Patient participation: complete - patient participated  Level of consciousness: awake and alert  Airway patency: patent  Nausea & Vomiting: no nausea  Complications: no  Cardiovascular status: hemodynamically stable  Respiratory status: acceptable  Hydration status: euvolemic

## 2022-10-25 NOTE — H&P
I have reviewed the history and physical and examined the patient. I find no relevant changes. I have reviewed with the patient and/or family members, during the preoperative office visit the risks, benefits, and alternatives to the procedure.     Saul Owens MD

## 2022-10-25 NOTE — ANESTHESIA PRE PROCEDURE
Department of Anesthesiology  Preprocedure Note       Name:  Petra Mitchell   Age:  78 y.o.  :  1955                                          MRN:  [id number]         Date:  10/25/2022      Surgeon: Darian Solitario):  Tony Jimenez MD    Procedure: Procedure(s):  ROBOTIC CHOLECYSTECTOMY WITH INTRAOPERATIVE CHOLANGIOGRAM, POSSIBLE OPEN PROCEDURE    Medications prior to admission:   Prior to Admission medications    Medication Sig Start Date End Date Taking?  Authorizing Provider   lisinopril (PRINIVIL;ZESTRIL) 10 MG tablet Take 1 tablet by mouth daily 10/11/22 11/10/22  Ulises Gallardo DO   albuterol sulfate (PROAIR RESPICLICK) 320 (90 Base) MCG/ACT aerosol powder inhalation Inhale into the lungs every 6 hours as needed for Wheezing or Shortness of Breath    Historical Provider, MD   atorvastatin (LIPITOR) 40 MG tablet Take 40 mg by mouth daily    Historical Provider, MD   esomeprazole Magnesium (NEXIUM) 40 MG PACK Take 40 mg by mouth daily    Historical Provider, MD   fluticasone-salmeterol (ADVAIR) 100-50 MCG/DOSE diskus inhaler Inhale 1 puff into the lungs every 12 hours    Historical Provider, MD       Current medications:    Current Facility-Administered Medications   Medication Dose Route Frequency Provider Last Rate Last Admin    famotidine (PEPCID) injection 20 mg  20 mg IntraVENous Once Jacquie Huggins MD        lactated ringers infusion   IntraVENous Continuous Jacquie Huggins MD        sodium chloride flush 0.9 % injection 5-40 mL  5-40 mL IntraVENous 2 times per day Jacquie Huggins MD        sodium chloride flush 0.9 % injection 5-40 mL  5-40 mL IntraVENous PRN Jacquie Huggins MD        0.9 % sodium chloride infusion   IntraVENous PRN Jacquie Huggins MD        ceFAZolin (ANCEF) 2000 mg in dextrose 5 % 100 mL IVPB  2,000 mg IntraVENous On Call to Hemanth Jauregui MD        indocyanine green (IC-GREEN) syringe 5 mg  5 mg IntraVENous Once Zulma Park MD           Allergies: Allergies   Allergen Reactions    Bactrim [Sulfamethoxazole-Trimethoprim] Hives       Problem List:    Patient Active Problem List   Diagnosis Code    Chest pain R07.9    History of tobacco abuse Z87.891    Acute cholecystitis K81.0       Past Medical History:        Diagnosis Date    Asthma     History of blood transfusion     Hyperlipidemia     Hypertension     Kidney stone     PONV (postoperative nausea and vomiting)        Past Surgical History:        Procedure Laterality Date    CYSTOSCOPY          HERNIA REPAIR      SHOULDER ARTHROSCOPY         Social History:    Social History     Tobacco Use    Smoking status: Former     Types: Cigarettes     Quit date: 1983     Years since quittin.1    Smokeless tobacco: Never   Substance Use Topics    Alcohol use: No                                Counseling given: Not Answered      Vital Signs (Current):   Vitals:    10/19/22 1254   Weight: 185 lb (83.9 kg)   Height: 5' 11\" (1.803 m)                                              BP Readings from Last 3 Encounters:   10/11/22 122/83   21 112/75   21 118/82       NPO Status:  mn+, see mar                                                                               BMI:   Wt Readings from Last 3 Encounters:   10/19/22 185 lb (83.9 kg)   10/10/22 185 lb (83.9 kg)   21 211 lb 8 oz (95.9 kg)     Body mass index is 25.8 kg/m².     CBC:   Lab Results   Component Value Date/Time    WBC 5.3 10/11/2022 05:55 AM    RBC 4.54 10/11/2022 05:55 AM    HGB 13.7 10/11/2022 05:55 AM    HCT 42.9 10/11/2022 05:55 AM    MCV 94.5 10/11/2022 05:55 AM    RDW 13.6 10/11/2022 05:55 AM     10/11/2022 05:55 AM       CMP:   Lab Results   Component Value Date/Time     10/11/2022 05:55 AM    K 4.1 10/11/2022 05:55 AM     10/11/2022 05:55 AM    CO2 27 10/11/2022 05:55 AM    BUN 14 10/11/2022 05:55 AM    CREATININE 0.9 10/11/2022 05:55 AM    GFRAA >60 10/11/2022 05:55 AM    AGRATIO 1.7 10/11/2022 05:55 AM    LABGLOM >60 10/11/2022 05:55 AM    GLUCOSE 98 10/11/2022 05:55 AM    PROT 6.0 10/11/2022 05:55 AM    CALCIUM 9.1 10/11/2022 05:55 AM    BILITOT 0.8 10/11/2022 05:55 AM    ALKPHOS 65 10/11/2022 05:55 AM    AST 12 10/11/2022 05:55 AM    ALT 11 10/11/2022 05:55 AM       POC Tests: No results for input(s): POCGLU, POCNA, POCK, POCCL, POCBUN, POCHEMO, POCHCT in the last 72 hours. Coags: No results found for: PROTIME, INR, APTT    HCG (If Applicable): No results found for: PREGTESTUR, PREGSERUM, HCG, HCGQUANT     ABGs: No results found for: PHART, PO2ART, YKW9JID, LWG2TUZ, BEART, A9QWFHME     Type & Screen (If Applicable):  No results found for: LABABO, LABRH    Drug/Infectious Status (If Applicable):  No results found for: HIV, HEPCAB    COVID-19 Screening (If Applicable): No results found for: COVID19        Anesthesia Evaluation  Patient summary reviewed   history of anesthetic complications: PONV. Airway: Mallampati: II  TM distance: >3 FB   Neck ROM: full  Mouth opening: > = 3 FB   Dental:          Pulmonary: breath sounds clear to auscultation  (+) asthma (no o2 req., daily and prn inhaler use):     (-) COPD, shortness of breath, recent URI, sleep apnea and not a current smoker          Patient did not smoke on day of surgery. Cardiovascular:    (+) hypertension:, hyperlipidemia    (-) pacemaker, past MI, CAD, CABG/stent, dysrhythmias,  angina and  CHF    ECG reviewed  Rhythm: regular  Rate: normal           Beta Blocker:  Not on Beta Blocker         Neuro/Psych:   Negative Neuro/Psych ROS     (-) seizures, TIA, CVA and psychiatric history           GI/Hepatic/Renal:   (+) GERD: well controlled, renal disease (hx hematuria): kidney stones,      (-) liver disease, bowel prep and no morbid obesity       Endo/Other:    (+) : arthritis:., malignancy/cancer (skin).     (-) diabetes mellitus, hypothyroidism,

## 2022-10-25 NOTE — BRIEF OP NOTE
Brief Postoperative Note      Patient: Maynor Tapia  YOB: 1955  MRN: [id number]    Date of Procedure: 10/25/2022    Pre-Op Diagnosis: ACUTE CHOLECYSTITIS    Post-Op Diagnosis: Same       Procedure(s):  ROBOTIC CHOLECYSTECTOMY WITH INTRAOPERATIVE CHOLANGIOGRAM    Surgeon(s):  Erica Mortensen MD    Assistant:  Surgical Assistant: Emi Cristobal    Anesthesia: General    Estimated Blood Loss (mL): Minimal    Complications: None    Specimens:   ID Type Source Tests Collected by Time Destination   A : GALLBLADDER AND CONTENTS Tissue Gallbladder SURGICAL PATHOLOGY Erica Mortensen MD 10/25/2022 1705        Findings:   Robotic cholecystectomy with IOC. No complications. Further details to follow in full operative report. Plan:  Home from [facility]    Electronically signed by José Miguel Enriquez MD on 10/25/2022 at 6:00 PM    I agree with above and was physically present and scrubbed and participated during the entirety of the operation.     Shubham Lay MD

## 2022-10-25 NOTE — DISCHARGE INSTRUCTIONS
[facility] GENERAL SURGERY [facility] AND [facility]. Kait Morris M.D. [facility] [id number] [place]                [address] [facility]         [place], [address] [facility] Rosalba Batista M.D                         [phone]        [facility] Thierno Bobby M.D. [facility]       POST-OPERATIVE INSTRUCTIONS FOR GALLBLADDER SURGERY    Call the office to schedule your post-operative appointment with your surgeon for two (2) weeks. You will have either white steri-strips and a water occlusive dressing or surgical glue closing your incisions. If you have clear bandages over your incisions, you may remove them in 3-4 days. Leave the steri-stips in place. These will peel away in 7-10 days. You may shower. Wash incisions gently, and pat them dry. Do not rub your incisions. General guidelines for activity:   Avoid strenuous activity or lifting anything heavier than 20 pounds. It is OK to be up  walking around, and walking up and down stairs. Do what is comfortable: stop and rest when you feel tired. Drink plenty of fluids and stay on a bland diet for 2-3 days after surgery. Do NOT drive while taking your narcotic pain medicine. Watch for signs of infection:  Excessive warmth or bright redness around your incisions  Leakage cloudy fluid from you incisions  Fever over 101.5  During the laparoscopic procedure that you had, gas is pumped into the abdominal cavity. You may feel abdominal, shoulder, or rib pain for a few days due to this gas. You will have pain medicine ordered.  Take as directed    If you experience constipation:  Increase your water intake  Increase your activity, walking is best.  A stool softener or mild laxative may be necessary if you still have not had a bowel movement ; call the office for further instructions. Please take note: IF you do not take all of your narcotic pain medication, we ask that you dispose of these responsibly. Drug drop off boxes are located in the [address] [place] [address] and [facility] emergency departments. These Med Safe return cabinets are available 24/7 in the emergency department [facility]. Hospital of office staff may NOT accept any medications to drop off in the cabinet. ANESTHESIA DISCHARGE INSTRUCTIONS    You are under the influence of drugs- do not drink alcohol, drive a car, operate machinery(such as power tools, kitchen appliances, etc), sign legal documents, or make any important decisions for 24 hours (or while on pain medications). Children should not ride bikes or [place] or play on gym sets  for 24 hours after surgery. A responsible adult should be with you for 24 hours. Rest at home today- increase activity as tolerated. Progress slowly to a regular diet unless your physician has instructed you otherwise. Drink plenty of water. CALL YOUR DOCTOR IF YOU:  Have moderate to severe nausea or vomiting AND are unable to hold down fluids or prescribed medications. Have bright red bloody drainage from your dressing that won't stop oozing.   Do not get relief with your pain medication    NORMAL (POSSIBLE) SIDE EFFECTS FROM ANESTHESIA:     Confusion, temporary memory loss, delayed reaction times in the first 24 hours  Lightheadedness, dizziness, difficulty focusing, blurred vision  Nausea/vomiting can happen  Shivering, feeling cold, sore throat, cough and muscle aches should stop within 24-48 hours  Trouble urinating - call your surgeon if it has been more than 8 hrs  Bruising or soreness at the IV site - call if it remains red, firm or there is drainage             FEMALES OF CHILDBEARING AGE WHO ARE TAKING BIRTH CONTROL PILLS:  You may have received a medication during

## 2022-10-25 NOTE — OP NOTE
Operative Note      Patient: Eusebio Marie  YOB: 1955  MRN: [id number]    Date of Procedure: 10/25/2022    Pre-Op Diagnosis: ACUTE CHOLECYSTITIS    Post-Op Diagnosis: Same       Procedure(s):  ROBOTIC CHOLECYSTECTOMY WITH INTRAOPERATIVE CHOLANGIOGRAM    Surgeon(s):  Elieser Jones MD    Assistant:   Surgical Assistant: Kerri Ponce    Anesthesia: General    Estimated Blood Loss (mL): Minimal    Complications: None    Specimens:   ID Type Source Tests Collected by Time Destination   A : GALLBLADDER AND CONTENTS Tissue Gallbladder SURGICAL PATHOLOGY Elieser Jones MD 10/25/2022 1705      Indication:  Nikki Sloan is a 78year old male with history of gallstone pancreatitis for which robotic cholecystectomy with intraoperative cholangiogram was recommended. The patient wished to proceed following discussion of the risks, benefits, and alternatives to the procedure. Detailed Description of Procedure: The patient was brought to the operating room and placed on the OR table in the supine position. After initiation of general anesthesia and securement of the endotracheal tube, he was secured to the table and SCDs were confirmed on and functioning. The abdomen was prepped and draped in the standard fashion. A timeout was performed according to hospital policy and all present were in agreement. An area in the left upper quadrant at the mid-clavicular line was chosen, injected with 0.5% marcaine with epinephrine and an 11-blade was used to make a 5 mm incision. The abdomen was then safely entered in direct Optiview fashion and pneumoperitoneum established. The area of entry was inspected and confirmed to be without injury. Three additional ports were then inserted under direct vision after injection of local anesthetic. An 8 mm robotic port in the right upper quadrant, a 12mm robotic port above the umbilicus and a 5mm port FCI between the previous ports.  The initial 5mm port in the left upper quadrant was then exchanged for an 8mm robotic port. The patient was then placed in reverse Trendelenburg with right side up. The robot was then docked in standard fashion. The fundus of the gallbladder was grasped and retracted upwards. Using robotic scissors with electrocautery, omental attachments to the gallbladder were taken down. The peritoneum overlying the gallbladder was then scored, revealing the base of the gallbladder and cystic duct. Using robotic scissors, the cystic duct was carefully circumferentially dissected. A robotic clip was then placed high on the cystic duct at its junction with the gallbladder. Using the scissors, a ductotomy was created just distal to this clip, through which the cholangiocatheter was inserted and secured with a robotic clip. A cholangiogram was then performed, confirming location within the cystic duct and revealed prompt flow of contrast into the duodenum, no filling defects, and normal backfilling of the hepatic tree. The cholangiocatheter was then carefully removed and an additional robotic clip was placed distally on the cystic duct. The cystic duct was then transected, leaving two clips on the stump which were confirmed intact. The cystic artery which had been previously identified was then carefully circumferentially dissected with robotic scissors. Two clips were placed proximally, and one distally, and the artery was divided with scissors. Using the scissors with cautery, the gallbladder was then carefully dissected off of the liver bed. An additional venous plexus was identified, which was doubly clipped and transected without issue. The gallbladder was then completely dissected off of the liver bed. The liver bed was inspected and found to be hemostatic. The robot was then un-docked and the remainder of the procedure performed laparoscopically.  The gallbladder was then placed in a retrieval bag and was easily removed from the 12mm port site. Insufflation was then discontinued and the remaining ports removed. The 12 mm port-site fascia was closed with a figure of eight 0-vicryl suture. The skin of all port sites were then closed with 3-0 vicryl in a subcuticular fashion and the skin was dressed with skin affix. The patient was awakened from anesthesia and taken to PACU in stable condition, there were no immediate complications. All counts were confirmed correct prior to closure. Dr. Shayla Mayer was present and scrubbed for the entirety of the operation and directed its course from beginning to end. Findings:   As above      Electronically signed by Jah Reid MD on 10/25/2022 at 7:15 PM    Surgery Staff    I have read and agree with the note by Jah Reid MD  from today; more than half of the total time was spent by me on the encounter.      Jarocho Hu MD

## 2022-10-26 NOTE — PROGRESS NOTES
Glory RN spoke to [address] [facility] regarding patient unable to urinate, 2 attempts. Per MD, OK to discharge patient to home with agreement to contact the office in the morning if he is still having difficulty voiding.

## 2022-10-26 NOTE — ADDENDUM NOTE
Addendum  created 10/25/22 2035 by Luther Sauceda MD    Order list changed, Pharmacy for encounter modified

## 2022-10-26 NOTE — PROGRESS NOTES
Discharge instructions given to pt and family. Verbalized understanding. PIV removed. Pt dressed and wheeled out and discharged to the care of their family in stable condition.

## 2022-10-26 NOTE — PROGRESS NOTES
Patient ambulated to the bathroom c/o nausea Zofran given at this time. Unable to urinate at this time.

## 2022-11-10 ENCOUNTER — OFFICE VISIT (OUTPATIENT)
Dept: SURGERY | Age: 67
End: 2022-11-10

## 2022-11-10 VITALS
HEIGHT: 71 IN | WEIGHT: 189 LBS | BODY MASS INDEX: 26.46 KG/M2 | DIASTOLIC BLOOD PRESSURE: 93 MMHG | SYSTOLIC BLOOD PRESSURE: 139 MMHG

## 2022-11-10 DIAGNOSIS — Z90.49 S/P LAPAROSCOPIC CHOLECYSTECTOMY: Primary | ICD-10-CM

## 2022-11-10 PROCEDURE — 99024 POSTOP FOLLOW-UP VISIT: CPT | Performed by: SURGERY

## 2022-11-10 NOTE — PROGRESS NOTES
HPI: Nursing notes reviewed. Patient is a 79 yo who underwent robotic cholecystectomy at [facility] [employer]   Reports minimal pain and no nausea. Energy is normal.  no diarrhea and no constipation. ROS:  10 point review of systems performed with pertinent positives in HPI    Phys:    Abd - soft, minimal tender, nondistended   Incisions - no drainage    Assesment: 79 yo s/p robotic cholecystectomy     Plan: 1. Doing well postop with minimal pain and no nausea   2. No activity limitations   3. Low fat diet   4.   Call with concerns

## 2024-10-26 ENCOUNTER — APPOINTMENT (OUTPATIENT)
Dept: GENERAL RADIOLOGY | Age: 69
End: 2024-10-26
Payer: MEDICARE

## 2024-10-26 ENCOUNTER — HOSPITAL ENCOUNTER (EMERGENCY)
Age: 69
Discharge: HOME | End: 2024-10-26
Attending: EMERGENCY MEDICINE
Payer: MEDICARE

## 2024-10-26 VITALS
OXYGEN SATURATION: 95 % | HEIGHT: 71 IN | TEMPERATURE: 98 F | WEIGHT: 210 LBS | DIASTOLIC BLOOD PRESSURE: 94 MMHG | RESPIRATION RATE: 15 BRPM | HEART RATE: 73 BPM | SYSTOLIC BLOOD PRESSURE: 131 MMHG | BODY MASS INDEX: 29.4 KG/M2

## 2024-10-26 DIAGNOSIS — R07.9 CHEST PAIN, UNSPECIFIED TYPE: Primary | ICD-10-CM

## 2024-10-26 DIAGNOSIS — I10 HYPERTENSION, UNSPECIFIED TYPE: ICD-10-CM

## 2024-10-26 LAB
ALBUMIN SERPL-MCNC: 4.1 G/DL (ref 3.4–5)
ALBUMIN/GLOB SERPL: 1.3 {RATIO} (ref 1.1–2.2)
ALP SERPL-CCNC: 77 U/L (ref 40–129)
ALT SERPL-CCNC: 17 U/L (ref 10–40)
ANION GAP SERPL CALCULATED.3IONS-SCNC: 10 MMOL/L (ref 3–16)
APTT BLD: 28 SEC (ref 22.1–36.4)
AST SERPL-CCNC: 18 U/L (ref 15–37)
BASOPHILS # BLD: 0.1 K/UL (ref 0–0.2)
BASOPHILS NFR BLD: 1.3 %
BILIRUB SERPL-MCNC: 0.9 MG/DL (ref 0–1)
BUN SERPL-MCNC: 14 MG/DL (ref 7–20)
CALCIUM SERPL-MCNC: 9.5 MG/DL (ref 8.3–10.6)
CHLORIDE SERPL-SCNC: 106 MMOL/L (ref 99–110)
CO2 SERPL-SCNC: 25 MMOL/L (ref 21–32)
CREAT SERPL-MCNC: 0.9 MG/DL (ref 0.8–1.3)
D-DIMER QUANTITATIVE: 0.35 UG/ML FEU (ref 0–0.6)
DEPRECATED RDW RBC AUTO: 13.3 % (ref 12.4–15.4)
EKG ATRIAL RATE: 79 BPM
EKG DIAGNOSIS: NORMAL
EKG P AXIS: 32 DEGREES
EKG P-R INTERVAL: 146 MS
EKG Q-T INTERVAL: 414 MS
EKG QRS DURATION: 142 MS
EKG QTC CALCULATION (BAZETT): 474 MS
EKG R AXIS: 54 DEGREES
EKG T AXIS: 7 DEGREES
EKG VENTRICULAR RATE: 79 BPM
EOSINOPHIL # BLD: 0.2 K/UL (ref 0–0.6)
EOSINOPHIL NFR BLD: 4.2 %
GFR SERPLBLD CREATININE-BSD FMLA CKD-EPI: >90 ML/MIN/{1.73_M2}
GLUCOSE SERPL-MCNC: 125 MG/DL (ref 70–99)
HCT VFR BLD AUTO: 47 % (ref 40.5–52.5)
HGB BLD-MCNC: 15.9 G/DL (ref 13.5–17.5)
LIPASE SERPL-CCNC: 13 U/L (ref 13–60)
LYMPHOCYTES # BLD: 1.1 K/UL (ref 1–5.1)
LYMPHOCYTES NFR BLD: 27.4 %
MCH RBC QN AUTO: 31.8 PG (ref 26–34)
MCHC RBC AUTO-ENTMCNC: 33.8 G/DL (ref 31–36)
MCV RBC AUTO: 94 FL (ref 80–100)
MONOCYTES # BLD: 0.4 K/UL (ref 0–1.3)
MONOCYTES NFR BLD: 10.9 %
NEUTROPHILS # BLD: 2.3 K/UL (ref 1.7–7.7)
NEUTROPHILS NFR BLD: 56.2 %
NT-PROBNP SERPL-MCNC: <36 PG/ML (ref 0–124)
PLATELET # BLD AUTO: 241 K/UL (ref 135–450)
PMV BLD AUTO: 8 FL (ref 5–10.5)
POTASSIUM SERPL-SCNC: 4.4 MMOL/L (ref 3.5–5.1)
PROT SERPL-MCNC: 7.2 G/DL (ref 6.4–8.2)
RBC # BLD AUTO: 5 M/UL (ref 4.2–5.9)
SODIUM SERPL-SCNC: 141 MMOL/L (ref 136–145)
TROPONIN, HIGH SENSITIVITY: 10 NG/L (ref 0–22)
TROPONIN, HIGH SENSITIVITY: 11 NG/L (ref 0–22)
WBC # BLD AUTO: 4.1 K/UL (ref 4–11)

## 2024-10-26 PROCEDURE — 6360000002 HC RX W HCPCS: Performed by: EMERGENCY MEDICINE

## 2024-10-26 PROCEDURE — 85379 FIBRIN DEGRADATION QUANT: CPT

## 2024-10-26 PROCEDURE — 93010 ELECTROCARDIOGRAM REPORT: CPT | Performed by: STUDENT IN AN ORGANIZED HEALTH CARE EDUCATION/TRAINING PROGRAM

## 2024-10-26 PROCEDURE — 93005 ELECTROCARDIOGRAM TRACING: CPT | Performed by: EMERGENCY MEDICINE

## 2024-10-26 PROCEDURE — 71045 X-RAY EXAM CHEST 1 VIEW: CPT

## 2024-10-26 PROCEDURE — 99285 EMERGENCY DEPT VISIT HI MDM: CPT

## 2024-10-26 PROCEDURE — 85025 COMPLETE CBC W/AUTO DIFF WBC: CPT

## 2024-10-26 PROCEDURE — 36415 COLL VENOUS BLD VENIPUNCTURE: CPT

## 2024-10-26 PROCEDURE — 96374 THER/PROPH/DIAG INJ IV PUSH: CPT

## 2024-10-26 PROCEDURE — 83690 ASSAY OF LIPASE: CPT

## 2024-10-26 PROCEDURE — 84484 ASSAY OF TROPONIN QUANT: CPT

## 2024-10-26 PROCEDURE — 6370000000 HC RX 637 (ALT 250 FOR IP): Performed by: EMERGENCY MEDICINE

## 2024-10-26 PROCEDURE — 80053 COMPREHEN METABOLIC PANEL: CPT

## 2024-10-26 PROCEDURE — 85730 THROMBOPLASTIN TIME PARTIAL: CPT

## 2024-10-26 PROCEDURE — 83880 ASSAY OF NATRIURETIC PEPTIDE: CPT

## 2024-10-26 RX ORDER — ONDANSETRON 2 MG/ML
4 INJECTION INTRAMUSCULAR; INTRAVENOUS ONCE
Status: COMPLETED | OUTPATIENT
Start: 2024-10-26 | End: 2024-10-26

## 2024-10-26 RX ORDER — ASPIRIN 81 MG/1
324 TABLET, CHEWABLE ORAL ONCE
Status: COMPLETED | OUTPATIENT
Start: 2024-10-26 | End: 2024-10-26

## 2024-10-26 RX ADMIN — ASPIRIN 324 MG: 81 TABLET, CHEWABLE ORAL at 08:30

## 2024-10-26 RX ADMIN — ONDANSETRON 4 MG: 2 INJECTION INTRAMUSCULAR; INTRAVENOUS at 07:36

## 2024-10-26 ASSESSMENT — PAIN - FUNCTIONAL ASSESSMENT: PAIN_FUNCTIONAL_ASSESSMENT: 0-10

## 2024-10-26 ASSESSMENT — PAIN DESCRIPTION - LOCATION: LOCATION: CHEST

## 2024-10-26 ASSESSMENT — LIFESTYLE VARIABLES
HOW MANY STANDARD DRINKS CONTAINING ALCOHOL DO YOU HAVE ON A TYPICAL DAY: PATIENT DOES NOT DRINK
HOW OFTEN DO YOU HAVE A DRINK CONTAINING ALCOHOL: NEVER

## 2024-10-26 NOTE — ED PROVIDER NOTES
examination.     No acute cardiopulmonary process.         Bedside Ultrasound, as interpreted by me, if performed:    No results found.    PROCEDURES     Unless otherwise noted below, none     Procedures    CRITICAL CARE TIME     I personally spent a total of 0 minutes of critical care time in obtaining history, performing a physical exam, bedside monitoring of interventions, collecting and interpreting tests and discussion with consultants but excluding time spent performing procedures, treating other patients and teaching time.                                                                                                         EMERGENCY DEPARTMENT COURSE and DIFFERENTIAL DIAGNOSIS/MDM:     Patient seen and evaluated. At presentation, patient was awake, alert, afebrile, hypertensive to 152/95, and satting 98% on room.  He was placed on cardiac monitor for close observation.  With no intervention, his blood pressure did improve to 130s systolic.  He was given full dose aspirin.  Differential diagnosis includes ACS, arrhythmia, PE, among others.  Troponin was 11 and trended down to 10.  Patient reports no chest pain on reassessment.  Creatinine is normal.  No leukocytosis.  D-dimer is within normal limits.  Therefore, patient at low risk for PE and no further imaging indicated at this time.  BNP less than 36.  Heart score is 3.  MAC less than 2%.  Patient given referral for cardiology.  Instructed to follow-up with cardiology for further evaluation and treatment.  Discussed following up with PCP for better blood pressure control and keeping a log of his blood pressures at home.  I answered all of his questions.  Discharged home with strict precautions    CONSULTS: (Who and What was discussed)  None    Is this patient to be included in the SEP-1 Core Measure due to severe sepsis or septic shock?   No     Exclusion criteria - the patient is NOT to be included for SEP-1 Core Measure due to:  2+ SIRS criteria are not  with Other Profesionals (Optional):42962}    {Social Determinants Significantly Affecting Health (Optional):72053}    {Records Reviewed External (Optional):53795}    CC/HPI Summary, DDx, ED Course, and Reassessment: ***    Disposition Considerations (tests considered but not done, Shared Decision Making, Pt Expectation of Test or Tx.): ***  {Escalation of care, including admission/OBS considered:64372}    CONSULTS: (Who and What was discussed)  None    Is this patient to be included in the SEP-1 Core Measure due to severe sepsis or septic shock?   {Rkt2ZlnInTu:20564}     During the patient's ED course, the patient was given:  Medications   ondansetron (ZOFRAN) injection 4 mg (4 mg IntraVENous Given 10/26/24 0736)   aspirin chewable tablet 324 mg (324 mg Oral Given 10/26/24 0830)            I am the Primary Clinician of Record.    FINAL IMPRESSION    No diagnosis found.      DISPOSITION/PLAN     DISPOSITION             PATIENT REFERRED TO:  No follow-up provider specified.    DISCHARGE MEDICATIONS:  Patient was given scripts for the following medications. I counseled patient how to take these medications:  New Prescriptions    No medications on file       DISCONTINUED MEDICATIONS:  Discontinued Medications    No medications on file       Pt was seen during the COVID 19 pandemic. Appropriate PPE worn by ME during patient encounters. Patient was cared for during a time with constrained hospital bed capacity with nationwide stress on resources and staffing.      (This chart was generated in part by using Dragon Dictation system and may contain errors related to that system including errors in grammar, punctuation, and spelling, as well as words and phrases that may be inappropriate. If there are any questions or concerns please feel free to contact the dictating provider for clarification.)

## 2024-10-26 NOTE — DISCHARGE INSTRUCTIONS
Follow up with cardiology for further evaluation and treatment.   If persistent or worsening symptoms, return to ED immediately.

## (undated) DEVICE — TROCAR: Brand: KII FIOS FIRST ENTRY

## (undated) DEVICE — SUTURE VCRL + SZ 3-0 L18IN ABSRB UD SH 1/2 CIR TAPERCUT NDL VCP864D

## (undated) DEVICE — MEDIUM-LARGE CLIP APPLIER: Brand: ENDOWRIST

## (undated) DEVICE — TISSUE RETRIEVAL SYSTEM: Brand: INZII RETRIEVAL SYSTEM

## (undated) DEVICE — C-ARM: Brand: UNBRANDED

## (undated) DEVICE — SUTURE VCRL + SZ 0 L27IN ABSRB VLT L26MM UR-6 5/8 CIR VCP603H

## (undated) DEVICE — ARM DRAPE

## (undated) DEVICE — COLUMN DRAPE

## (undated) DEVICE — TIP COVER ACCESSORY

## (undated) DEVICE — CATHETER CHOLGM 4.5FR L18IN W/ MTL SUPP TB

## (undated) DEVICE — SEAL

## (undated) DEVICE — SYSTEM SMK EVAC LAP TBNG FILTER HSNG BENT STYL PNK SEE CLR

## (undated) DEVICE — MARYLAND BIPOLAR FORCEPS: Brand: ENDOWRIST

## (undated) DEVICE — GLOVE,SURG,SENSICARE SLT,LF,PF,7: Brand: MEDLINE

## (undated) DEVICE — SOLUTION IV IRRIG WATER 1000ML POUR BRL 2F7114

## (undated) DEVICE — REDUCER: Brand: ENDOWRIST

## (undated) DEVICE — Device

## (undated) DEVICE — CANNULA SEAL

## (undated) DEVICE — MONOPOLAR CURVED SCISSORS: Brand: ENDOWRIST

## (undated) DEVICE — STOPCOCK 3-WAY INTRAVENOUS

## (undated) DEVICE — FENESTRATED BIPOLAR FORCEPS: Brand: ENDOWRIST

## (undated) DEVICE — BLADELESS OBTURATOR: Brand: WECK VISTA

## (undated) DEVICE — CLIP INT M L POLYMER LOK LIG HEM O LOK

## (undated) DEVICE — SOLUTION IV 1000ML 0.9% SOD CHL PH 5 INJ USP VIAFLX PLAS